# Patient Record
Sex: FEMALE | Race: AMERICAN INDIAN OR ALASKA NATIVE | Employment: OTHER | URBAN - METROPOLITAN AREA
[De-identification: names, ages, dates, MRNs, and addresses within clinical notes are randomized per-mention and may not be internally consistent; named-entity substitution may affect disease eponyms.]

---

## 2017-09-11 ENCOUNTER — HOSPITAL ENCOUNTER (OUTPATIENT)
Facility: CLINIC | Age: 72
Setting detail: OBSERVATION
Discharge: HOME | End: 2017-09-12
Attending: INTERNAL MEDICINE | Admitting: EMERGENCY MEDICINE
Payer: MEDICARE

## 2017-09-11 ENCOUNTER — APPOINTMENT (OUTPATIENT)
Dept: ULTRASOUND IMAGING | Facility: CLINIC | Age: 72
End: 2017-09-11
Attending: INTERNAL MEDICINE
Payer: MEDICARE

## 2017-09-11 ENCOUNTER — APPOINTMENT (OUTPATIENT)
Dept: GENERAL RADIOLOGY | Facility: CLINIC | Age: 72
End: 2017-09-11
Attending: INTERNAL MEDICINE
Payer: MEDICARE

## 2017-09-11 DIAGNOSIS — E11.9 DIABETES MELLITUS (H): ICD-10-CM

## 2017-09-11 DIAGNOSIS — E78.5 HYPERLIPIDEMIA, UNSPECIFIED HYPERLIPIDEMIA TYPE: ICD-10-CM

## 2017-09-11 DIAGNOSIS — R07.9 CHEST PAIN, UNSPECIFIED: ICD-10-CM

## 2017-09-11 DIAGNOSIS — R74.8 ELEVATED LIPASE: ICD-10-CM

## 2017-09-11 DIAGNOSIS — Z79.84 LONG TERM CURRENT USE OF ORAL HYPOGLYCEMIC DRUG: ICD-10-CM

## 2017-09-11 DIAGNOSIS — I10 ESSENTIAL HYPERTENSION, MALIGNANT: ICD-10-CM

## 2017-09-11 DIAGNOSIS — R07.89 ATYPICAL CHEST PAIN: ICD-10-CM

## 2017-09-11 DIAGNOSIS — R74.8 ACID PHOSPHATASE ELEVATED: ICD-10-CM

## 2017-09-11 LAB
ALBUMIN SERPL-MCNC: 3.7 G/DL (ref 3.4–5)
ALP SERPL-CCNC: 90 U/L (ref 40–150)
ALT SERPL W P-5'-P-CCNC: 30 U/L (ref 0–50)
AMYLASE SERPL-CCNC: 74 U/L (ref 30–110)
ANION GAP SERPL CALCULATED.3IONS-SCNC: 9 MMOL/L (ref 3–14)
AST SERPL W P-5'-P-CCNC: 19 U/L (ref 0–45)
BASOPHILS # BLD AUTO: 0 10E9/L (ref 0–0.2)
BASOPHILS NFR BLD AUTO: 0.3 %
BILIRUB SERPL-MCNC: 0.3 MG/DL (ref 0.2–1.3)
BUN SERPL-MCNC: 23 MG/DL (ref 7–30)
CALCIUM SERPL-MCNC: 8.5 MG/DL (ref 8.5–10.1)
CHLORIDE SERPL-SCNC: 100 MMOL/L (ref 94–109)
CO2 SERPL-SCNC: 29 MMOL/L (ref 20–32)
CREAT SERPL-MCNC: 0.91 MG/DL (ref 0.52–1.04)
CRP SERPL-MCNC: 9.4 MG/L (ref 0–8)
D DIMER PPP FEU-MCNC: 0.4 UG/ML FEU (ref 0–0.5)
DIFFERENTIAL METHOD BLD: NORMAL
EOSINOPHIL # BLD AUTO: 0.4 10E9/L (ref 0–0.7)
EOSINOPHIL NFR BLD AUTO: 3.8 %
ERYTHROCYTE [DISTWIDTH] IN BLOOD BY AUTOMATED COUNT: 13.2 % (ref 10–15)
GFR SERPL CREATININE-BSD FRML MDRD: 61 ML/MIN/1.7M2
GLUCOSE BLDC GLUCOMTR-MCNC: 155 MG/DL (ref 70–99)
GLUCOSE SERPL-MCNC: 154 MG/DL (ref 70–99)
HCT VFR BLD AUTO: 37 % (ref 35–47)
HGB BLD-MCNC: 12.4 G/DL (ref 11.7–15.7)
IMM GRANULOCYTES # BLD: 0.1 10E9/L (ref 0–0.4)
IMM GRANULOCYTES NFR BLD: 0.9 %
LIPASE SERPL-CCNC: 828 U/L (ref 73–393)
LYMPHOCYTES # BLD AUTO: 2.2 10E9/L (ref 0.8–5.3)
LYMPHOCYTES NFR BLD AUTO: 20.2 %
MAGNESIUM SERPL-MCNC: 2.1 MG/DL (ref 1.6–2.3)
MCH RBC QN AUTO: 32 PG (ref 26.5–33)
MCHC RBC AUTO-ENTMCNC: 33.5 G/DL (ref 31.5–36.5)
MCV RBC AUTO: 95 FL (ref 78–100)
MONOCYTES # BLD AUTO: 0.7 10E9/L (ref 0–1.3)
MONOCYTES NFR BLD AUTO: 6.8 %
NEUTROPHILS # BLD AUTO: 7.4 10E9/L (ref 1.6–8.3)
NEUTROPHILS NFR BLD AUTO: 68 %
NRBC # BLD AUTO: 0 10*3/UL
NRBC BLD AUTO-RTO: 0 /100
NT-PROBNP SERPL-MCNC: 132 PG/ML (ref 0–900)
PLATELET # BLD AUTO: 206 10E9/L (ref 150–450)
POTASSIUM SERPL-SCNC: 3.8 MMOL/L (ref 3.4–5.3)
PROT SERPL-MCNC: 7.3 G/DL (ref 6.8–8.8)
RBC # BLD AUTO: 3.88 10E12/L (ref 3.8–5.2)
SODIUM SERPL-SCNC: 138 MMOL/L (ref 133–144)
TROPONIN I BLD-MCNC: 0 UG/L (ref 0–0.1)
TROPONIN I SERPL-MCNC: <0.015 UG/L (ref 0–0.04)
TSH SERPL DL<=0.005 MIU/L-ACNC: 2.33 MU/L (ref 0.4–4)
WBC # BLD AUTO: 10.9 10E9/L (ref 4–11)

## 2017-09-11 PROCEDURE — 76705 ECHO EXAM OF ABDOMEN: CPT

## 2017-09-11 PROCEDURE — A9270 NON-COVERED ITEM OR SERVICE: HCPCS | Mod: GY | Performed by: INTERNAL MEDICINE

## 2017-09-11 PROCEDURE — 82150 ASSAY OF AMYLASE: CPT | Performed by: INTERNAL MEDICINE

## 2017-09-11 PROCEDURE — 85025 COMPLETE CBC W/AUTO DIFF WBC: CPT | Performed by: INTERNAL MEDICINE

## 2017-09-11 PROCEDURE — 25000132 ZZH RX MED GY IP 250 OP 250 PS 637: Mod: GY | Performed by: INTERNAL MEDICINE

## 2017-09-11 PROCEDURE — 83880 ASSAY OF NATRIURETIC PEPTIDE: CPT | Performed by: INTERNAL MEDICINE

## 2017-09-11 PROCEDURE — 84484 ASSAY OF TROPONIN QUANT: CPT | Mod: 91

## 2017-09-11 PROCEDURE — 83735 ASSAY OF MAGNESIUM: CPT | Performed by: INTERNAL MEDICINE

## 2017-09-11 PROCEDURE — 00000146 ZZHCL STATISTIC GLUCOSE BY METER IP

## 2017-09-11 PROCEDURE — 93005 ELECTROCARDIOGRAM TRACING: CPT | Performed by: INTERNAL MEDICINE

## 2017-09-11 PROCEDURE — 84484 ASSAY OF TROPONIN QUANT: CPT | Performed by: INTERNAL MEDICINE

## 2017-09-11 PROCEDURE — G0378 HOSPITAL OBSERVATION PER HR: HCPCS

## 2017-09-11 PROCEDURE — 93010 ELECTROCARDIOGRAM REPORT: CPT | Mod: Z6 | Performed by: INTERNAL MEDICINE

## 2017-09-11 PROCEDURE — 99207 ZZC APP CREDIT; MD BILLING SHARED VISIT: CPT | Mod: 25 | Performed by: INTERNAL MEDICINE

## 2017-09-11 PROCEDURE — 99285 EMERGENCY DEPT VISIT HI MDM: CPT | Mod: 25 | Performed by: INTERNAL MEDICINE

## 2017-09-11 PROCEDURE — 71010 XR CHEST PORT 1 VW: CPT

## 2017-09-11 PROCEDURE — 84443 ASSAY THYROID STIM HORMONE: CPT | Performed by: INTERNAL MEDICINE

## 2017-09-11 PROCEDURE — 86140 C-REACTIVE PROTEIN: CPT | Performed by: INTERNAL MEDICINE

## 2017-09-11 PROCEDURE — 83690 ASSAY OF LIPASE: CPT | Performed by: INTERNAL MEDICINE

## 2017-09-11 PROCEDURE — 85379 FIBRIN DEGRADATION QUANT: CPT | Performed by: INTERNAL MEDICINE

## 2017-09-11 PROCEDURE — 99219 ZZC INITIAL OBSERVATION CARE,LEVL II: CPT | Mod: Z6 | Performed by: EMERGENCY MEDICINE

## 2017-09-11 PROCEDURE — 80053 COMPREHEN METABOLIC PANEL: CPT | Performed by: INTERNAL MEDICINE

## 2017-09-11 RX ORDER — GLIPIZIDE 10 MG/1
10 TABLET ORAL
COMMUNITY

## 2017-09-11 RX ORDER — NITROGLYCERIN 0.4 MG/1
0.4 TABLET SUBLINGUAL EVERY 5 MIN PRN
Status: DISCONTINUED | OUTPATIENT
Start: 2017-09-11 | End: 2017-09-12 | Stop reason: HOSPADM

## 2017-09-11 RX ORDER — NALOXONE HYDROCHLORIDE 0.4 MG/ML
.1-.4 INJECTION, SOLUTION INTRAMUSCULAR; INTRAVENOUS; SUBCUTANEOUS
Status: DISCONTINUED | OUTPATIENT
Start: 2017-09-11 | End: 2017-09-12 | Stop reason: HOSPADM

## 2017-09-11 RX ORDER — ALUMINA, MAGNESIA, AND SIMETHICONE 2400; 2400; 240 MG/30ML; MG/30ML; MG/30ML
15-30 SUSPENSION ORAL EVERY 4 HOURS PRN
Status: DISCONTINUED | OUTPATIENT
Start: 2017-09-11 | End: 2017-09-12 | Stop reason: HOSPADM

## 2017-09-11 RX ORDER — LANSOPRAZOLE 30 MG/1
30 CAPSULE, DELAYED RELEASE ORAL DAILY
COMMUNITY

## 2017-09-11 RX ORDER — DEXTROSE MONOHYDRATE 25 G/50ML
25-50 INJECTION, SOLUTION INTRAVENOUS
Status: DISCONTINUED | OUTPATIENT
Start: 2017-09-11 | End: 2017-09-12 | Stop reason: HOSPADM

## 2017-09-11 RX ORDER — FLUOROMETHOLONE 0.1 %
1 SUSPENSION, DROPS(FINAL DOSAGE FORM)(ML) OPHTHALMIC (EYE) DAILY
COMMUNITY

## 2017-09-11 RX ORDER — SODIUM CHLORIDE 9 MG/ML
INJECTION, SOLUTION INTRAVENOUS CONTINUOUS
Status: DISCONTINUED | OUTPATIENT
Start: 2017-09-11 | End: 2017-09-12 | Stop reason: HOSPADM

## 2017-09-11 RX ORDER — GLIPIZIDE 5 MG/1
10 TABLET ORAL
Status: DISCONTINUED | OUTPATIENT
Start: 2017-09-12 | End: 2017-09-12 | Stop reason: HOSPADM

## 2017-09-11 RX ORDER — LEVOTHYROXINE SODIUM 25 UG/1
100 TABLET ORAL DAILY
Status: DISCONTINUED | OUTPATIENT
Start: 2017-09-12 | End: 2017-09-12 | Stop reason: HOSPADM

## 2017-09-11 RX ORDER — LANSOPRAZOLE 30 MG/1
30 CAPSULE, DELAYED RELEASE ORAL DAILY
Status: DISCONTINUED | OUTPATIENT
Start: 2017-09-12 | End: 2017-09-12 | Stop reason: HOSPADM

## 2017-09-11 RX ORDER — SUCRALFATE ORAL 1 G/10ML
1 SUSPENSION ORAL ONCE
Status: COMPLETED | OUTPATIENT
Start: 2017-09-11 | End: 2017-09-11

## 2017-09-11 RX ORDER — ACETAMINOPHEN 650 MG/1
650 SUPPOSITORY RECTAL EVERY 4 HOURS PRN
Status: DISCONTINUED | OUTPATIENT
Start: 2017-09-11 | End: 2017-09-12 | Stop reason: HOSPADM

## 2017-09-11 RX ORDER — MONTELUKAST SODIUM 10 MG/1
10 TABLET ORAL AT BEDTIME
COMMUNITY

## 2017-09-11 RX ORDER — ALBUTEROL SULFATE 0.83 MG/ML
2.5 SOLUTION RESPIRATORY (INHALATION) EVERY 6 HOURS PRN
Status: DISCONTINUED | OUTPATIENT
Start: 2017-09-11 | End: 2017-09-12 | Stop reason: HOSPADM

## 2017-09-11 RX ORDER — NICOTINE POLACRILEX 4 MG
15-30 LOZENGE BUCCAL
Status: DISCONTINUED | OUTPATIENT
Start: 2017-09-11 | End: 2017-09-12 | Stop reason: HOSPADM

## 2017-09-11 RX ORDER — ROSUVASTATIN CALCIUM 10 MG/1
10 TABLET, COATED ORAL AT BEDTIME
Status: DISCONTINUED | OUTPATIENT
Start: 2017-09-11 | End: 2017-09-12 | Stop reason: HOSPADM

## 2017-09-11 RX ORDER — TIOTROPIUM BROMIDE 18 UG/1
18 CAPSULE ORAL; RESPIRATORY (INHALATION) DAILY
COMMUNITY

## 2017-09-11 RX ORDER — LISINOPRIL 20 MG/1
40 TABLET ORAL DAILY
Status: DISCONTINUED | OUTPATIENT
Start: 2017-09-12 | End: 2017-09-12 | Stop reason: HOSPADM

## 2017-09-11 RX ORDER — LIDOCAINE 40 MG/G
CREAM TOPICAL
Status: DISCONTINUED | OUTPATIENT
Start: 2017-09-11 | End: 2017-09-12 | Stop reason: HOSPADM

## 2017-09-11 RX ORDER — LISINOPRIL 40 MG/1
40 TABLET ORAL DAILY
COMMUNITY

## 2017-09-11 RX ORDER — HYDROCHLOROTHIAZIDE 12.5 MG/1
25 TABLET ORAL DAILY
Status: DISCONTINUED | OUTPATIENT
Start: 2017-09-12 | End: 2017-09-12 | Stop reason: HOSPADM

## 2017-09-11 RX ORDER — ACETAMINOPHEN 325 MG/1
650 TABLET ORAL EVERY 4 HOURS PRN
Status: DISCONTINUED | OUTPATIENT
Start: 2017-09-11 | End: 2017-09-12 | Stop reason: HOSPADM

## 2017-09-11 RX ORDER — ROSUVASTATIN CALCIUM 10 MG/1
10 TABLET, COATED ORAL EVERY OTHER DAY
COMMUNITY

## 2017-09-11 RX ORDER — MONTELUKAST SODIUM 10 MG/1
10 TABLET ORAL AT BEDTIME
Status: DISCONTINUED | OUTPATIENT
Start: 2017-09-11 | End: 2017-09-12 | Stop reason: HOSPADM

## 2017-09-11 RX ORDER — LEVOTHYROXINE SODIUM 100 UG/1
100 TABLET ORAL DAILY
COMMUNITY

## 2017-09-11 RX ORDER — HYDROCHLOROTHIAZIDE 25 MG/1
25 TABLET ORAL DAILY
COMMUNITY

## 2017-09-11 RX ADMIN — SUCRALFATE 1 G: 1 SUSPENSION ORAL at 18:02

## 2017-09-11 ASSESSMENT — ENCOUNTER SYMPTOMS
WHEEZING: 0
SHORTNESS OF BREATH: 0
COUGH: 0
ADENOPATHY: 0
HEADACHES: 0
PALPITATIONS: 0
FEVER: 0
ABDOMINAL PAIN: 0
DIARRHEA: 0
CONFUSION: 0
NECK PAIN: 0
NUMBNESS: 0
BACK PAIN: 1
NAUSEA: 0
DIFFICULTY URINATING: 0
CHILLS: 0
LIGHT-HEADEDNESS: 0
VOMITING: 0
WEAKNESS: 0

## 2017-09-11 NOTE — ED NOTES
Walked patient to bathroom, no pain with that walk.  Pt said she feels better.  Voided x1.  Updated MD.

## 2017-09-11 NOTE — IP AVS SNAPSHOT
Unit 6D Observation [address] [address]    Phone:  [phone]    Fax:  [phone]                                       After Visit Summary   9/11/2017    Dionna Lal    MRN: [id number]           After Visit Summary Signature Page     I have received my discharge instructions, and my questions have been answered. I have discussed any challenges I see with this plan with the nurse or doctor.    ..........................................................................................................................................  Patient/Patient Representative Signature      ..........................................................................................................................................  Patient Representative Print Name and Relationship to Patient    ..................................................               ................................................  Date                                            Time    ..........................................................................................................................................  Reviewed by Signature/Title    ...................................................              ..............................................  Date                                                            Time

## 2017-09-11 NOTE — IP AVS SNAPSHOT
MRN:[id number]                      After Visit Summary   9/11/2017    Dionna Lal    MRN: [id number]           Thank you!     Thank you for choosing [facility] for your care. Our goal is always to provide you with excellent care. Hearing back from our patients is one way we can continue to improve our services. Please take a few minutes to complete the written survey that you may receive in the mail after you visit with us. Thank you!        Patient Information     Date Of Birth          1945        Designated Caregiver       Most Recent Value    Caregiver    Will someone help with your care after discharge? no      About your hospital stay     You were admitted on:  September 11, 2017 You last received care in the:  Unit 6D Observation [facility]    You were discharged on:  September 12, 2017        Reason for your hospital stay       Chest pain                  Who to Call     For medical emergencies, please call 911.  For non-urgent questions about your medical care, please call your primary care provider or clinic, [phone]          Attending Provider     Provider Specialty    Chaim Olivera MD Emergency Medicine    [facility], Nona Larios MD Emergency Medicine       Primary Care Provider Office Phone # Fax #    Maribel Reina [phone]       When to contact your care team       Please go to your nearest emergency room If you were to have a change in the type of chest pain i.e more severe, lasting longer or radiating to your shoulder, arm, neck, jaw or back, shortness of breath or increased pain with breathing, coughing up blood, feel dizzy or lightheaded, or notice swelling in one leg.                  After Care Instructions     Activity       Your activity upon discharge: As tolerated            Diet       Follow this diet upon discharge: As previous to your hospitalization            Discharge Instructions       The chest pain you came into the  "emergency room for does not appear to be cardiac chest pain. Your heart enzymes were normal which tells us there was no damage to the heart muscle.     I recommend continuing your home medications and it will be very important to follow up with your primary care doctor early next week.     Continue to drink plenty of fluids.                  Follow-up Appointments     Adult [facility] Follow-up and recommended labs and tests       Follow up with PCP in one week, return to the ER if having chest pain, syncope, shortness of breath or fever greater than 101F. Your cholesterol was elevated. Please discuss with your primary care doctor further cholesterol management. Your hemoglobin A1C was elevated at 7.6 Please also follow up with your primary care doctor for further diabetes management.      Appointments on [place] and/or [facility] (with [facility] or [facility] provider or service). Call [phone] if you haven't heard regarding these appointments within 7 days of discharge.                  Pending Results     No orders found for last 3 day(s).            Statement of Approval     Ordered          09/12/17 1142  I have reviewed and agree with all the recommendations and orders detailed in this document.  EFFECTIVE NOW     Approved and electronically signed by:  Caitlin Little APRN CNP             Admission Information     Date & Time Provider Department Dept. Phone    9/11/2017 Nona Rangel MD Unit 6D Observation [facility] [place] [phone]      Your Vitals Were     Blood Pressure Pulse Temperature Respirations Weight Pulse Oximetry    134/58 (BP Location: Right arm) 92 98.2  F (36.8  C) (Oral) 18 102.1 kg (225 lb) 100%    BMI (Body Mass Index)                   38.62 kg/m2           "[employer] Information     "[employer] lets you send messages to your doctor, view your test results, renew your prescriptions, schedule appointments and more. To sign up, go to [email or web address] . Click on \"Log in\" on the left " "side of the screen, which will take you to the Welcome page. Then click on \"Sign up Now\" on the right side of the page.     You will be asked to enter the access code listed below, as well as some personal information. Please follow the directions to create your username and password.     Your access code is: [email or web address]  Expires: 2017 11:43 AM     Your access code will  in 90 days. If you need help or a new code, please call your [facility] clinic or [phone].        Care EveryWhere ID     This is your Care EveryWhere ID. This could be used by other organizations to access your [facility] medical records  [id number]        Equal Access to Services     WILLIAN BRADLEY : Christopher Majano, marybel booth, javier llanos, trevor manzo. So [facility] [phone].    ATENCIÓN: Si habla español, tiene a gomez disposición servicios gratuitos de asistencia lingüística. Llame al [phone].    We comply with applicable federal civil rights laws and [place] laws. We do not discriminate on the basis of race, color, national origin, age, disability sex, sexual orientation or gender identity.               Review of your medicines      CONTINUE these medicines which have NOT CHANGED        Dose / Directions    * albuterol (2.5 MG/3ML) 0.083% neb solution        Dose:  1 ampule   Take 1 ampule by nebulization every 6 hours as needed.   Refills:  0       * PROAIR  (90 BASE) MCG/ACT Inhaler   Generic drug:  albuterol        Take two puffs every four hours for 24 hours, then two puffs four times daily for one week.   Quantity:  1 Inhaler   Refills:  0       carboxymethylcellulose 1 % ophthalmic solution   Commonly known as:  CELLUVISC/REFRESH LIQUIGEL        Dose:  1 drop   Place 1 drop into both eyes daily as needed for dry eyes   Refills:  0       fluorometholone 0.1 % ophthalmic susp   Commonly known as:  FML LIQUIFILM        Dose:  1 drop   Place 1 drop into " both eyes daily   Refills:  0       glipiZIDE 10 MG tablet   Commonly known as:  GLUCOTROL        Dose:  10 mg   Take 10 mg by mouth 2 times daily (before meals)   Refills:  0       hydrochlorothiazide 25 MG tablet   Commonly known as:  HYDRODIURIL        Dose:  25 mg   Take 25 mg by mouth daily   Refills:  0       insulin glargine 100 UNIT/ML injection   Commonly known as:  LANTUS        Dose:  20 Units   Inject 20 Units Subcutaneous At Bedtime   Refills:  0       LANsoprazole 30 MG CR capsule   Commonly known as:  PREVACID        Dose:  30 mg   Take 30 mg by mouth daily   Refills:  0       levothyroxine 100 MCG tablet   Commonly known as:  SYNTHROID/LEVOTHROID        Dose:  100 mcg   Take 100 mcg by mouth daily   Refills:  0       lisinopril 40 MG tablet   Commonly known as:  PRINIVIL/ZESTRIL        Dose:  40 mg   Take 40 mg by mouth daily   Refills:  0       loratadine 10 MG ODT tab   Commonly known as:  CLARITIN REDITABS        Dose:  10 mg   Take 10 mg by mouth daily.   Refills:  0       metFORMIN 850 MG tablet   Commonly known as:  GLUCOPHAGE        Dose:  850 mg   Take 850 mg by mouth 2 times daily (with meals)   Refills:  0       mometasone-formoterol 200-5 MCG/ACT oral inhaler   Commonly known as:  DULERA        Dose:  2 puff   Inhale 2 puffs into the lungs 2 times daily   Refills:  0       montelukast 10 MG tablet   Commonly known as:  SINGULAIR        Dose:  10 mg   Take 10 mg by mouth At Bedtime   Refills:  0       rosuvastatin 10 MG tablet   Commonly known as:  CRESTOR        Dose:  10 mg   Take 10 mg by mouth every other day   Refills:  0       tiotropium 18 MCG capsule   Commonly known as:  SPIRIVA        Dose:  18 mcg   Inhale 18 mcg into the lungs daily   Refills:  0       * Notice:  This list has 2 medication(s) that are the same as other medications prescribed for you. Read the directions carefully, and ask your doctor or other care provider to review them with you.             Protect others  around you: Learn how to safely use, store and throw away your medicines at www.disposemymeds.org.             Medication List: This is a list of all your medications and when to take them. Check marks below indicate your daily home schedule. Keep this list as a reference.      Medications           Morning Afternoon Evening Bedtime As Needed    * albuterol (2.5 MG/3ML) 0.083% neb solution   Take 1 ampule by nebulization every 6 hours as needed.                                * PROAIR  (90 BASE) MCG/ACT Inhaler   Take two puffs every four hours for 24 hours, then two puffs four times daily for one week.   Generic drug:  albuterol                                carboxymethylcellulose 1 % ophthalmic solution   Commonly known as:  CELLUVISC/REFRESH LIQUIGEL   Place 1 drop into both eyes daily as needed for dry eyes                                fluorometholone 0.1 % ophthalmic susp   Commonly known as:  FML LIQUIFILM   Place 1 drop into both eyes daily                                glipiZIDE 10 MG tablet   Commonly known as:  GLUCOTROL   Take 10 mg by mouth 2 times daily (before meals)   Last time this was given:  10 mg on 9/12/2017  8:29 AM                                hydrochlorothiazide 25 MG tablet   Commonly known as:  HYDRODIURIL   Take 25 mg by mouth daily   Last time this was given:  25 mg on 9/12/2017  8:29 AM                                insulin glargine 100 UNIT/ML injection   Commonly known as:  LANTUS   Inject 20 Units Subcutaneous At Bedtime   Last time this was given:  20 Units on 9/12/2017 12:05 AM                                LANsoprazole 30 MG CR capsule   Commonly known as:  PREVACID   Take 30 mg by mouth daily   Last time this was given:  30 mg on 9/12/2017  8:30 AM                                levothyroxine 100 MCG tablet   Commonly known as:  SYNTHROID/LEVOTHROID   Take 100 mcg by mouth daily   Last time this was given:  100 mcg on 9/12/2017  8:29 AM                                 lisinopril 40 MG tablet   Commonly known as:  PRINIVIL/ZESTRIL   Take 40 mg by mouth daily   Last time this was given:  40 mg on 9/12/2017  8:29 AM                                loratadine 10 MG ODT tab   Commonly known as:  CLARITIN REDITABS   Take 10 mg by mouth daily.                                metFORMIN 850 MG tablet   Commonly known as:  GLUCOPHAGE   Take 850 mg by mouth 2 times daily (with meals)   Last time this was given:  850 mg on 9/12/2017  8:30 AM                                mometasone-formoterol 200-5 MCG/ACT oral inhaler   Commonly known as:  DULERA   Inhale 2 puffs into the lungs 2 times daily   Last time this was given:  2 puffs on 9/12/2017  8:30 AM                                montelukast 10 MG tablet   Commonly known as:  SINGULAIR   Take 10 mg by mouth At Bedtime   Last time this was given:  10 mg on 9/12/2017 12:05 AM                                rosuvastatin 10 MG tablet   Commonly known as:  CRESTOR   Take 10 mg by mouth every other day   Last time this was given:  10 mg on 9/12/2017 12:05 AM                                tiotropium 18 MCG capsule   Commonly known as:  SPIRIVA   Inhale 18 mcg into the lungs daily                                * Notice:  This list has 2 medication(s) that are the same as other medications prescribed for you. Read the directions carefully, and ask your doctor or other care provider to review them with you.

## 2017-09-11 NOTE — ED PROVIDER NOTES
History     Chief Complaint   Patient presents with     Chest Pain     chest pain since yesterday     HPI  Dionna Lal is a 72 year old female who presents with upper sternal bilateral chest pain which radiates to the back. This has been present since yesterday. The pain comes when she is up exerting herself and resolves with rest. There is no change with respiration. She has no shortness of breath, cough, sputum, or palpitations. She has no nausea, vomiting or abdominal pain. She has no leg pain or swelling. She recently completed a course of prednisone for asthma. She has no swallowing difficulty or abdominal pain. She has history of hypotensive reaction to NSAIDs.    PAST MEDICAL HISTORY:   Past Medical History:   Diagnosis Date     Arthritis     knees and fingers and shoulders     Asthma      Diabetes mellitus (H)      Diverticulosis      GERD (gastroesophageal reflux disease)      Hypertension      Osteoporosis      Thyroid disease        PAST SURGICAL HISTORY:   Past Surgical History:   Procedure Laterality Date     EXTRACAPSULAR CATARACT EXTRATION WITH INTRAOCULAR LENS IMPLANT  2013     REMOVAL OF NAIL BED         FAMILY HISTORY:   Family History   Problem Relation Age of Onset     Hypertension Sister      DIABETES Sister      Hypertension Daughter      CEREBROVASCULAR DISEASE Brother        SOCIAL HISTORY:   Social History   Substance Use Topics     Smoking status: Former Smoker     Packs/day: 0.50     Years: 30.00     Smokeless tobacco: Never Used     Alcohol use No      Comment: 2 cans/week         I have reviewed the Medications, Allergies, Past Medical and Surgical History, and Social History in the Epic system.    Review of Systems   Constitutional: Negative for chills and fever.   HENT: Negative for congestion.    Eyes: Negative for visual disturbance.   Respiratory: Negative for cough, shortness of breath and wheezing.    Cardiovascular: Positive for chest pain. Negative for palpitations and  leg swelling.   Gastrointestinal: Negative for abdominal pain, diarrhea, nausea and vomiting.   Genitourinary: Negative for difficulty urinating.   Musculoskeletal: Positive for back pain. Negative for neck pain.   Skin: Negative for rash.   Neurological: Negative for weakness, light-headedness, numbness and headaches.   Hematological: Negative for adenopathy.   Psychiatric/Behavioral: Negative for confusion.       Physical Exam   BP: 146/68  Pulse: 92  Resp: 18  Weight: 102.1 kg (225 lb)  SpO2: 97 %  Physical Exam   Constitutional: She is oriented to person, place, and time. She appears well-developed and well-nourished. No distress.   Mildly cushingoid face   HENT:   Head: Normocephalic and atraumatic.   Right Ear: External ear normal.   Left Ear: External ear normal.   Nose: Nose normal.   Mouth/Throat: Oropharynx is clear and moist. No oropharyngeal exudate.   Eyes: EOM are normal. Pupils are equal, round, and reactive to light. No scleral icterus.   Neck: Normal range of motion. Neck supple. No JVD present. Carotid bruit is not present.   Cardiovascular: Normal rate, regular rhythm and normal heart sounds.  Exam reveals no friction rub.    No murmur heard.  Pulmonary/Chest: Effort normal and breath sounds normal. She has no wheezes. She has no rales. She exhibits no tenderness.   Abdominal: Soft. Bowel sounds are normal. There is no tenderness. There is no rebound and no guarding.   Musculoskeletal: She exhibits no edema or tenderness.   Lymphadenopathy:     She has no cervical adenopathy.   Neurological: She is alert and oriented to person, place, and time. She displays normal reflexes. No cranial nerve deficit. She exhibits normal muscle tone. Coordination normal.   Skin: Skin is warm and dry. No rash noted.   Psychiatric: She has a normal mood and affect. Her behavior is normal.   Nursing note and vitals reviewed.      ED Course     ED Course     Procedures             EKG Interpretation:      Interpreted by  APRIL GASPAR  Time reviewed: 1731  Symptoms at time of EKG: chest pain   Rhythm: normal sinus   Rate: Normal  Axis: Normal  Ectopy: none  Conduction: right bundle branch block (complete)  ST Segments/ T Waves: T wave inversion V1  Q Waves: none  Comparison to prior: Unchanged from 03/03/15    Clinical Impression: no acute changes      Labs/Imaging    Results for orders placed or performed during the hospital encounter of 09/11/17 (from the past 24 hour(s))   EKG 12 lead   Result Value Ref Range    Interpretation ECG Click View Image link to view waveform and result    CBC with platelets differential   Result Value Ref Range    WBC 10.9 4.0 - 11.0 10e9/L    RBC Count 3.88 3.8 - 5.2 10e12/L    Hemoglobin 12.4 11.7 - 15.7 g/dL    Hematocrit 37.0 35.0 - 47.0 %    MCV 95 78 - 100 fl    MCH 32.0 26.5 - 33.0 pg    MCHC 33.5 31.5 - 36.5 g/dL    RDW 13.2 10.0 - 15.0 %    Platelet Count 206 150 - 450 10e9/L    Diff Method Automated Method     % Neutrophils 68.0 %    % Lymphocytes 20.2 %    % Monocytes 6.8 %    % Eosinophils 3.8 %    % Basophils 0.3 %    % Immature Granulocytes 0.9 %    Nucleated RBCs 0 0 /100    Absolute Neutrophil 7.4 1.6 - 8.3 10e9/L    Absolute Lymphocytes 2.2 0.8 - 5.3 10e9/L    Absolute Monocytes 0.7 0.0 - 1.3 10e9/L    Absolute Eosinophils 0.4 0.0 - 0.7 10e9/L    Absolute Basophils 0.0 0.0 - 0.2 10e9/L    Abs Immature Granulocytes 0.1 0 - 0.4 10e9/L    Absolute Nucleated RBC 0.0    Comprehensive metabolic panel   Result Value Ref Range    Sodium 138 133 - 144 mmol/L    Potassium 3.8 3.4 - 5.3 mmol/L    Chloride 100 94 - 109 mmol/L    Carbon Dioxide 29 20 - 32 mmol/L    Anion Gap 9 3 - 14 mmol/L    Glucose 154 (H) 70 - 99 mg/dL    Urea Nitrogen 23 7 - 30 mg/dL    Creatinine 0.91 0.52 - 1.04 mg/dL    GFR Estimate 61 >60 mL/min/1.7m2    GFR Estimate If Black 73 >60 mL/min/1.7m2    Calcium 8.5 8.5 - 10.1 mg/dL    Bilirubin Total 0.3 0.2 - 1.3 mg/dL    Albumin 3.7 3.4 - 5.0 g/dL    Protein Total 7.3 6.8  - 8.8 g/dL    Alkaline Phosphatase 90 40 - 150 U/L    ALT 30 0 - 50 U/L    AST 19 0 - 45 U/L   CRP inflammation   Result Value Ref Range    CRP Inflammation 9.4 (H) 0.0 - 8.0 mg/L   Lipase   Result Value Ref Range    Lipase 828 (H) 73 - 393 U/L   BNP   Result Value Ref Range    N-Terminal Pro BNP Inpatient 132 0 - 900 pg/mL   D dimer quantitative   Result Value Ref Range    D Dimer 0.4 0.0 - 0.50 ug/ml FEU   Magnesium   Result Value Ref Range    Magnesium 2.1 1.6 - 2.3 mg/dL   TSH with free T4 reflex   Result Value Ref Range    TSH 2.33 0.40 - 4.00 mU/L   Troponin I   Result Value Ref Range    Troponin I ES <0.015 0.000 - 0.045 ug/L   Amylase   Result Value Ref Range    Amylase 74 30 - 110 U/L   Troponin POCT   Result Value Ref Range    Troponin I 0.00 0.00 - 0.10 ug/L   Chest  XR, 1 view portable    Narrative    XR CHEST PORT 1 VW  9/11/2017 6:14 PM     HISTORY:  chest pain    COMPARISON: Film dated 9/15/2016    FINDINGS:  Heart and pulmonary vasculature are within normal limits.  Lungs appear clear.      Impression    IMPRESSION: No active infiltrate identified.    MARLEN DING MD   Abdomen US, limited (RUQ only)    Narrative    US ABDOMEN LIMITED  9/11/2017 7:58 PM     HISTORY:  pancreatitis    COMPARISON: None.    FINDINGS:   Gallbladder: Normal with no cholelithiasis, no wall thickening, and no  focal tenderness.    Bile ducts:  CHD is normal diameter.  No intrahepatic biliary  dilatation.    Liver:  The liver is echogenic consistent with fatty infiltration.    Pancreas:  Partially obscured by gas. Visualized portions appear  normal.    Right kidney:  Normal.       Impression    IMPRESSION:  No gallstones are identified. Visualized portions of the  pancreas appear normal.       Assessments & Plan (with Medical Decision Making)   Impression:  Older female with a history of Asthma and type 2 DM. She had some chest pain about 1.5 years ago and had a normal stress echo at that time. She just completed a course of  antibiotic and prednisone for asthma flare. For the past 2 days, she has had bilateral upper chest pain radiating to the upper back which worsened during activity such as light walking and upright position and resolved with sitting and rest. Eating had no effect on the pain. She did have improvement of the symptoms after arrival in the ED, possibly in response to carafate suspension. She has longstanding RBBB. She has no acute ischemic changes on EKG. Troponin is normal. CXR is unremarkable. She has normal troponin, LFT and d-dimer. Electrolytes are unremarkable. She has isolated elevation of lipase, with normal amylase. Her symptoms are very atypical for pancreatitis. She has normal RUQ US with no evidence of gallstones, cholecystitis or CBD widening. Her symptoms are more suggestive of exertional angina. She has multiple risk factors for ASCVD including age, diabetes, hypertension and past smoking. She is currently pain free. She did not receive aspirin since she has a history of apparent true anaphylactic reaction to NSAID.    She will be admitted to the 6D observation unit for chest pain observation with serial troponin, telemetry monitoring and stress echo. She should have repeat amylase and lipase in the AM. Suspect the elevated lipase is medication related and not the cause of her current symptoms. She should continue PPI and carafate.    I have reviewed the nursing notes.    I have reviewed the findings, diagnosis, plan and need for follow up with the patient.    New Prescriptions    No medications on file       Final diagnoses:   Atypical chest pain   Elevated lipase       9/11/2017   [facility], [place], EMERGENCY DEPARTMENT     Chaim Olivera MD  09/11/17 1907

## 2017-09-12 ENCOUNTER — APPOINTMENT (OUTPATIENT)
Dept: CARDIOLOGY | Facility: CLINIC | Age: 72
End: 2017-09-12
Attending: PHYSICIAN ASSISTANT
Payer: MEDICARE

## 2017-09-12 VITALS
WEIGHT: 225 LBS | BODY MASS INDEX: 38.62 KG/M2 | DIASTOLIC BLOOD PRESSURE: 58 MMHG | SYSTOLIC BLOOD PRESSURE: 134 MMHG | HEART RATE: 92 BPM | RESPIRATION RATE: 18 BRPM | TEMPERATURE: 98.2 F | OXYGEN SATURATION: 100 %

## 2017-09-12 LAB
CHOLEST SERPL-MCNC: 150 MG/DL
GLUCOSE BLDC GLUCOMTR-MCNC: 146 MG/DL (ref 70–99)
HBA1C MFR BLD: 7.6 % (ref 4.3–6)
HDLC SERPL-MCNC: 71 MG/DL
INTERPRETATION ECG - MUSE: NORMAL
LDLC SERPL CALC-MCNC: 42 MG/DL
LIPASE SERPL-CCNC: 297 U/L (ref 73–393)
NONHDLC SERPL-MCNC: 78 MG/DL
TRIGL SERPL-MCNC: 184 MG/DL
TROPONIN I SERPL-MCNC: <0.015 UG/L (ref 0–0.04)
TROPONIN I SERPL-MCNC: <0.015 UG/L (ref 0–0.04)

## 2017-09-12 PROCEDURE — 93321 DOPPLER ECHO F-UP/LMTD STD: CPT | Mod: 26 | Performed by: INTERNAL MEDICINE

## 2017-09-12 PROCEDURE — 83690 ASSAY OF LIPASE: CPT | Performed by: PHYSICIAN ASSISTANT

## 2017-09-12 PROCEDURE — A9270 NON-COVERED ITEM OR SERVICE: HCPCS | Mod: GY | Performed by: PHYSICIAN ASSISTANT

## 2017-09-12 PROCEDURE — 25000132 ZZH RX MED GY IP 250 OP 250 PS 637: Mod: GY | Performed by: PHYSICIAN ASSISTANT

## 2017-09-12 PROCEDURE — 96372 THER/PROPH/DIAG INJ SC/IM: CPT

## 2017-09-12 PROCEDURE — 25000125 ZZHC RX 250: Performed by: INTERNAL MEDICINE

## 2017-09-12 PROCEDURE — 93018 CV STRESS TEST I&R ONLY: CPT | Performed by: INTERNAL MEDICINE

## 2017-09-12 PROCEDURE — 00000146 ZZHCL STATISTIC GLUCOSE BY METER IP

## 2017-09-12 PROCEDURE — 25000131 ZZH RX MED GY IP 250 OP 636 PS 637: Mod: GY | Performed by: PHYSICIAN ASSISTANT

## 2017-09-12 PROCEDURE — 25500064 ZZH RX 255 OP 636: Performed by: INTERNAL MEDICINE

## 2017-09-12 PROCEDURE — 36415 COLL VENOUS BLD VENIPUNCTURE: CPT | Performed by: PHYSICIAN ASSISTANT

## 2017-09-12 PROCEDURE — 93016 CV STRESS TEST SUPVJ ONLY: CPT | Performed by: INTERNAL MEDICINE

## 2017-09-12 PROCEDURE — 40000264 ECHO DOBUTAMINE STRESS TEST WITH DEFINITY

## 2017-09-12 PROCEDURE — G0378 HOSPITAL OBSERVATION PER HR: HCPCS

## 2017-09-12 PROCEDURE — 94640 AIRWAY INHALATION TREATMENT: CPT

## 2017-09-12 PROCEDURE — 84484 ASSAY OF TROPONIN QUANT: CPT | Mod: 91 | Performed by: PHYSICIAN ASSISTANT

## 2017-09-12 PROCEDURE — 25000128 H RX IP 250 OP 636: Performed by: INTERNAL MEDICINE

## 2017-09-12 PROCEDURE — 83036 HEMOGLOBIN GLYCOSYLATED A1C: CPT | Performed by: PHYSICIAN ASSISTANT

## 2017-09-12 PROCEDURE — 93325 DOPPLER ECHO COLOR FLOW MAPG: CPT | Mod: 26 | Performed by: INTERNAL MEDICINE

## 2017-09-12 PROCEDURE — 93350 STRESS TTE ONLY: CPT | Mod: 26 | Performed by: INTERNAL MEDICINE

## 2017-09-12 PROCEDURE — 99217 ZZC OBSERVATION CARE DISCHARGE: CPT | Mod: Z6 | Performed by: NURSE PRACTITIONER

## 2017-09-12 PROCEDURE — 25000128 H RX IP 250 OP 636: Performed by: PHYSICIAN ASSISTANT

## 2017-09-12 PROCEDURE — 80061 LIPID PANEL: CPT | Performed by: PHYSICIAN ASSISTANT

## 2017-09-12 RX ORDER — METOPROLOL TARTRATE 1 MG/ML
15 INJECTION, SOLUTION INTRAVENOUS
Status: DISCONTINUED | OUTPATIENT
Start: 2017-09-12 | End: 2017-09-12

## 2017-09-12 RX ORDER — DOBUTAMINE HYDROCHLORIDE 200 MG/100ML
5-50 INJECTION INTRAVENOUS CONTINUOUS
Status: DISCONTINUED | OUTPATIENT
Start: 2017-09-12 | End: 2017-09-12

## 2017-09-12 RX ADMIN — MONTELUKAST SODIUM 10 MG: 10 TABLET, FILM COATED ORAL at 00:05

## 2017-09-12 RX ADMIN — METFORMIN HYDROCHLORIDE 850 MG: 850 TABLET, FILM COATED ORAL at 00:05

## 2017-09-12 RX ADMIN — METOPROLOL TARTRATE 3 MG: 5 INJECTION INTRAVENOUS at 09:44

## 2017-09-12 RX ADMIN — HYDROCHLOROTHIAZIDE 25 MG: 12.5 TABLET ORAL at 08:29

## 2017-09-12 RX ADMIN — INSULIN GLARGINE 20 UNITS: 100 INJECTION, SOLUTION SUBCUTANEOUS at 00:05

## 2017-09-12 RX ADMIN — PERFLUTREN 7 ML: 6.52 INJECTION, SUSPENSION INTRAVENOUS at 09:46

## 2017-09-12 RX ADMIN — GLIPIZIDE 10 MG: 5 TABLET ORAL at 08:29

## 2017-09-12 RX ADMIN — LANSOPRAZOLE 30 MG: 30 CAPSULE, DELAYED RELEASE ORAL at 08:30

## 2017-09-12 RX ADMIN — DOBUTAMINE IN DEXTROSE 10 MCG/KG/MIN: 200 INJECTION, SOLUTION INTRAVENOUS at 09:38

## 2017-09-12 RX ADMIN — METFORMIN HYDROCHLORIDE 850 MG: 850 TABLET, FILM COATED ORAL at 08:30

## 2017-09-12 RX ADMIN — ROSUVASTATIN CALCIUM 10 MG: 10 TABLET, FILM COATED ORAL at 00:05

## 2017-09-12 RX ADMIN — LISINOPRIL 40 MG: 20 TABLET ORAL at 08:29

## 2017-09-12 RX ADMIN — LEVOTHYROXINE SODIUM 100 MCG: 25 TABLET ORAL at 08:29

## 2017-09-12 RX ADMIN — SODIUM CHLORIDE: 9 INJECTION, SOLUTION INTRAVENOUS at 00:56

## 2017-09-12 NOTE — PLAN OF CARE
Problem: Discharge Planning  Goal: Discharge Planning (Adult, OB, Behavioral, Peds)  List all goals to be met before discharge home:     - Serial troponins and stress test complete: NO  - Seen and cleared by consultant if applicable : NO  - Adequate pain control on oral analgesia : YES, patient denies pain at this time.  - Vital signs normal or at patient baseline : YES  - Safe disposition plan has been identified : NO        Nurse to notify provider when observation goals have been met and patient is ready for discharge.

## 2017-09-12 NOTE — ED NOTES
ED to Floor Handoff      S:  Dionna Lal is a 72 year old female who speaks English and lives with family members,  in a home  They arrived in the ED by car from home with a complaint of Chest Pain (chest pain since yesterday)    Initial vitals were:   BP: 146/68  Pulse: 92  Heart Rate: 86  Temp: 98  F (36.7  C)  Resp: 18  Weight: 102.1 kg (225 lb)  SpO2: 97 %  Allergies:   Allergies   Allergen Reactions     Nsaids Shortness Of Breath   .  The meds given in the ED and their home medications are:   No current facility-administered medications for this encounter.      Current Outpatient Prescriptions   Medication     predniSONE (DELTASONE) 20 MG tablet     guaiFENesin-codeine (ROBITUSSIN AC) 100-10 MG/5ML SOLN     amoxicillin-clavulanate (AUGMENTIN) 875-125 MG per tablet     Rosuvastatin Calcium (CRESTOR PO)     insulin glargine (LANTUS) 100 UNIT/ML vial     albuterol (PROAIR HFA) 108 (90 BASE) MCG/ACT inhaler     LISINOPRIL PO     HYDROCHLOROTHIAZIDE PO     LANSOPRAZOLE PO     GLIPIZIDE PO     Levothyroxine Sodium (LEVOTHROID PO)     fluticasone (FLOVENT HFA) 220 MCG/ACT inhaler     albuterol (2.5 MG/3ML) 0.083% nebulizer solution     loratadine (CLARITIN REDITABS) 10 MG dissolvable tablet     Montelukast Sodium (SINGULAIR PO)     METFORMIN HCL PO     Social demographics are   Social History     Social History     Marital status: Single     Spouse name: N/A     Number of children: N/A     Years of education: N/A     Social History Main Topics     Smoking status: Former Smoker     Packs/day: 0.50     Years: 30.00     Smokeless tobacco: Never Used     Alcohol use No      Comment: 2 cans/week     Drug use: No     Sexual activity: No     Other Topics Concern     None     Social History Narrative       B:   The patient has been ill for 2 day(s) and during this time the symptoms have increased.  In the ED was diagnosed with   Final diagnoses:   Atypical chest pain   Elevated lipase    Infection/sepsis suspected:No  Isolation type; No active isolations   A:   In the ED these meds were given:   Medications   sucralfate (CARAFATE) suspension 1 g (1 g Oral Given 9/11/17 1802)     Drips running?  No  Labs results   Labs Ordered and Resulted from Time of ED Arrival Up to the Time of Departure from the ED   COMPREHENSIVE METABOLIC PANEL - Abnormal; Notable for the following:        Result Value    Glucose 154 (*)     All other components within normal limits   CRP INFLAMMATION - Abnormal; Notable for the following:     CRP Inflammation 9.4 (*)     All other components within normal limits   LIPASE - Abnormal; Notable for the following:     Lipase 828 (*)     All other components within normal limits   CBC WITH PLATELETS DIFFERENTIAL   NT PROBNP INPATIENT   D DIMER QUANTITATIVE   MAGNESIUM   TSH WITH FREE T4 REFLEX   TROPONIN I   AMYLASE   MAY SALINE LOCK IV   CARDIAC CONTINUOUS MONITORING   PULSE OXIMETRY NURSING   TROPONIN POCT     Imaging Studies:   Recent Results (from the past 24 hour(s))   Chest  XR, 1 view portable    Narrative    XR CHEST PORT 1 VW  9/11/2017 6:14 PM     HISTORY:  chest pain    COMPARISON: Film dated 9/15/2016    FINDINGS:  Heart and pulmonary vasculature are within normal limits.  Lungs appear clear.      Impression    IMPRESSION: No active infiltrate identified.    MARLEN DING MD   Abdomen US, limited (RUQ only)    Narrative    US ABDOMEN LIMITED  9/11/2017 7:58 PM     HISTORY:  pancreatitis    COMPARISON: None.    FINDINGS:   Gallbladder: Normal with no cholelithiasis, no wall thickening, and no  focal tenderness.    Bile ducts:  CHD is normal diameter.  No intrahepatic biliary  dilatation.    Liver:  The liver is echogenic consistent with fatty infiltration.    Pancreas:  Partially obscured by gas. Visualized portions appear  normal.    Right kidney:  Normal.       Impression    IMPRESSION:  No gallstones are identified. Visualized portions of the  pancreas appear normal.     Recent vital signs /65   Pulse 92  Temp 98  F (36.7  C) (Oral)  Resp 21  Wt 102.1 kg (225 lb)  SpO2 95%  BMI 38.62 kg/m2  Cardiac Rhythm: ,   Cardiac  Cardiac Rhythm: Normal sinus rhythm  Abnormal labs/tests/findings requiring intervention:---  Pain control: good  Nausea control: pt had none  R:   Transfer assistance needed: Independent  Family present during ED course? No   Family currently present? No  Pt needs tele? Yes  Code Status: Full Code  Tasks needing to be completed:---    Connie Max  [facility]-- [phone] [place] [facility]  [phone] [facility]

## 2017-09-12 NOTE — PROGRESS NOTES
Patient discharged home. PIV removed. Patient has all belongings, understands and agrees with d/c instructions.

## 2017-09-12 NOTE — PROGRESS NOTES
Emergency Medicine Observation Attending note    The patient was independently seen and examined by me. The chart, vital signs, and labs were reviewed. The patient's findings were discussed with the YUDELKA on the observation unit, and I agree with the findings of the note and the plan.    71 yo female with h/o DM, HTN, HLD, admitted to the obs unit after presenting to the ER with c/o intermittent cp x 2 day. She reports that the pain is central, and radiates to her back. No associated SOB, N/V, diaphoresis, cough, or other complaints. She denies h/o DVT/PE, LE sx, recent surgery or bed rest, recent travel. EKG stable, trop neg, cxr neg. Lipase up, but no RUQ or epigastric pain/tenderness, and RUQ u/s nl in ED. She was given carafate in the ED - denies sx now.     /66 (BP Location: Right arm)  Pulse 92  Temp 98.3  F (36.8  C) (Oral)  Resp 18  Wt 102.1 kg (225 lb)  SpO2 96%  BMI 38.62 kg/m2    Exam:  General: awake, alert, NAD  HEENT: NC/AT, oropharynx moist and clear  Neck: supple  Lungs: CTA-B  Heart: RRR, no M/R/G  Abd: soft, ND/NT  Ext: non-tender, no edema    Assessment/plan:  1. Exertional CT: stable EKG, trop neg. CXR nl. Sx intermittent, and pt pain free now - PE and dissection very unlikely. Will continue to monitor overnight, and follow serial trops - will plan for stress test in the am.

## 2017-09-12 NOTE — H&P
[facility] ED Observation Admission Note    Chief Complaint   Patient presents with     Chest Pain     chest pain since yesterday       HPI:  Adapted from the ED Provider HPI:  Dionna Lal is a 72 year old female with a history of hypertension, hyperlipidemia, type 2 diabetes mellitus, asthma, hypothyroidism, GERD, who presents with upper bilateral chest pain which radiates to the back.     This has been present since yesterday. The pain comes when she is up exerting herself and resolves with rest. There is no change with respiration. She has no shortness of breath, cough, sputum, or palpitations. She has no nausea, vomiting or abdominal pain. She has no leg pain or swelling. She recently completed a course of prednisone for asthma. She has no swallowing difficulty or abdominal pain. She has history of hypotensive reaction to NSAIDs.    In the ED:  Vital signs within normal limits, afebrile. Normal troponin. Lipase noted to be elevated at 828 but amylase normal. RUQ U/S normal. Patient denies GI symptoms. D-dimer and BNP normal. EKG without changes. CXR normal. Patient administered carafate with some relief. Due to multiple risk factors including HTN, DM2, obestiy, HLP, patient admitted to observation unit for ACS rule out.    On admission to the observation unit, the patient reported symptoms had resolved and she was feeling her baseline. Agreeable to plan of care.    History:       Allergies   Allergen Reactions     Nsaids Shortness Of Breath       Past Medical History:   Diagnosis Date     Arthritis     knees and fingers and shoulders     Asthma      Diabetes mellitus (H)      Diverticulosis      GERD (gastroesophageal reflux disease)      Hypertension      Osteoporosis      Thyroid disease        Past Surgical History:   Procedure Laterality Date     EXTRACAPSULAR CATARACT EXTRATION WITH INTRAOCULAR LENS IMPLANT  2013     REMOVAL OF NAIL BED         Family History   Problem Relation Age of Onset      Hypertension Sister      DIABETES Sister      Hypertension Daughter      CEREBROVASCULAR DISEASE Brother        Social History     Social History     Marital status: Single     Spouse name: N/A     Number of children: N/A     Years of education: N/A     Occupational History     Not on file.     Social History Main Topics     Smoking status: Former Smoker     Packs/day: 0.50     Years: 30.00     Smokeless tobacco: Never Used     Alcohol use No      Comment: 2 cans/week     Drug use: No     Sexual activity: No     Other Topics Concern     Not on file     Social History Narrative         No current facility-administered medications on file prior to encounter.   Current Outpatient Prescriptions on File Prior to Encounter:  predniSONE (DELTASONE) 20 MG tablet Take two tablets (= 40mg) each day for 5 (five) days   guaiFENesin-codeine (ROBITUSSIN AC) 100-10 MG/5ML SOLN Take 10 mLs by mouth every 4 hours as needed   amoxicillin-clavulanate (AUGMENTIN) 875-125 MG per tablet Take 1 tablet by mouth 2 times daily   Rosuvastatin Calcium (CRESTOR PO) Take 10 mg by mouth every 48 hours    insulin glargine (LANTUS) 100 UNIT/ML vial Inject 20 Units Subcutaneous At Bedtime   albuterol (PROAIR HFA) 108 (90 BASE) MCG/ACT inhaler Take two puffs every four hours for 24 hours, then two puffs four times daily for one week.   LISINOPRIL PO Take 40 mg by mouth daily.     HYDROCHLOROTHIAZIDE PO Take 25 mg by mouth daily.     LANSOPRAZOLE PO Take 30 mg by mouth every morning (before breakfast).     GLIPIZIDE PO Take 10 mg by mouth 2 times daily (before meals).     Levothyroxine Sodium (LEVOTHROID PO) Take 50 mg by mouth.     fluticasone (FLOVENT HFA) 220 MCG/ACT inhaler Take 2 puffs by mouth 2 times daily.     albuterol (2.5 MG/3ML) 0.083% nebulizer solution Take 1 ampule by nebulization every 6 hours as needed.     loratadine (CLARITIN REDITABS) 10 MG dissolvable tablet Take 10 mg by mouth daily.     Montelukast Sodium (SINGULAIR PO) Take 10  mg by mouth At Bedtime.     METFORMIN HCL PO Take 850 mg by mouth 2 times daily (with meals).       Data:    Results for orders placed or performed during the hospital encounter of 09/11/17   Chest  XR, 1 view portable    Narrative    XR CHEST PORT 1 VW  9/11/2017 6:14 PM     HISTORY:  chest pain    COMPARISON: Film dated 9/15/2016    FINDINGS:  Heart and pulmonary vasculature are within normal limits.  Lungs appear clear.      Impression    IMPRESSION: No active infiltrate identified.    MARLEN DING MD   Abdomen US, limited (RUQ only)    Narrative    US ABDOMEN LIMITED  9/11/2017 7:58 PM     HISTORY:  pancreatitis    COMPARISON: None.    FINDINGS:   Gallbladder: Normal with no cholelithiasis, no wall thickening, and no  focal tenderness.    Bile ducts:  CHD is normal diameter.  No intrahepatic biliary  dilatation.    Liver:  The liver is echogenic consistent with fatty infiltration.    Pancreas:  Partially obscured by gas. Visualized portions appear  normal.    Right kidney:  Normal.       Impression    IMPRESSION:  No gallstones are identified. Visualized portions of the  pancreas appear normal.   CBC with platelets differential   Result Value Ref Range    WBC 10.9 4.0 - 11.0 10e9/L    RBC Count 3.88 3.8 - 5.2 10e12/L    Hemoglobin 12.4 11.7 - 15.7 g/dL    Hematocrit 37.0 35.0 - 47.0 %    MCV 95 78 - 100 fl    MCH 32.0 26.5 - 33.0 pg    MCHC 33.5 31.5 - 36.5 g/dL    RDW 13.2 10.0 - 15.0 %    Platelet Count 206 150 - 450 10e9/L    Diff Method Automated Method     % Neutrophils 68.0 %    % Lymphocytes 20.2 %    % Monocytes 6.8 %    % Eosinophils 3.8 %    % Basophils 0.3 %    % Immature Granulocytes 0.9 %    Nucleated RBCs 0 0 /100    Absolute Neutrophil 7.4 1.6 - 8.3 10e9/L    Absolute Lymphocytes 2.2 0.8 - 5.3 10e9/L    Absolute Monocytes 0.7 0.0 - 1.3 10e9/L    Absolute Eosinophils 0.4 0.0 - 0.7 10e9/L    Absolute Basophils 0.0 0.0 - 0.2 10e9/L    Abs Immature Granulocytes 0.1 0 - 0.4 10e9/L    Absolute Nucleated  RBC 0.0    Comprehensive metabolic panel   Result Value Ref Range    Sodium 138 133 - 144 mmol/L    Potassium 3.8 3.4 - 5.3 mmol/L    Chloride 100 94 - 109 mmol/L    Carbon Dioxide 29 20 - 32 mmol/L    Anion Gap 9 3 - 14 mmol/L    Glucose 154 (H) 70 - 99 mg/dL    Urea Nitrogen 23 7 - 30 mg/dL    Creatinine 0.91 0.52 - 1.04 mg/dL    GFR Estimate 61 >60 mL/min/1.7m2    GFR Estimate If Black 73 >60 mL/min/1.7m2    Calcium 8.5 8.5 - 10.1 mg/dL    Bilirubin Total 0.3 0.2 - 1.3 mg/dL    Albumin 3.7 3.4 - 5.0 g/dL    Protein Total 7.3 6.8 - 8.8 g/dL    Alkaline Phosphatase 90 40 - 150 U/L    ALT 30 0 - 50 U/L    AST 19 0 - 45 U/L   CRP inflammation   Result Value Ref Range    CRP Inflammation 9.4 (H) 0.0 - 8.0 mg/L   Lipase   Result Value Ref Range    Lipase 828 (H) 73 - 393 U/L   BNP   Result Value Ref Range    N-Terminal Pro BNP Inpatient 132 0 - 900 pg/mL   D dimer quantitative   Result Value Ref Range    D Dimer 0.4 0.0 - 0.50 ug/ml FEU   Magnesium   Result Value Ref Range    Magnesium 2.1 1.6 - 2.3 mg/dL   TSH with free T4 reflex   Result Value Ref Range    TSH 2.33 0.40 - 4.00 mU/L   Troponin I   Result Value Ref Range    Troponin I ES <0.015 0.000 - 0.045 ug/L   Amylase   Result Value Ref Range    Amylase 74 30 - 110 U/L   EKG 12 lead   Result Value Ref Range    Interpretation ECG Click View Image link to view waveform and result    Troponin POCT   Result Value Ref Range    Troponin I 0.00 0.00 - 0.10 ug/L             EKG Interpretation:       Interpreted by APRIL GASPAR  Time reviewed: 1731  Symptoms at time of EKG: chest pain   Rhythm: normal sinus   Rate: Normal  Axis: Normal  Ectopy: none  Conduction: right bundle branch block (complete)  ST Segments/ T Waves: T wave inversion V1  Q Waves: none  Comparison to prior: Unchanged from 03/03/15     Clinical Impression: no acute changes      ROS:    10 point ROS negative other than the symptoms noted above.      Exam:    Vitals:  B/P: 140/71, T: 98, P: 92, R:  17    Constitutional: healthy, alert and no distress. Overweight.  Head: Normocephalic.   Cardiovascular:  No lifts, heaves, or thrills. RRR. No murmurs, clicks gallops or rub  Respiratory: Good diaphragmatic excursion. Lungs clear  Gastrointestinal: Abdomen soft, non-tender. BS normal. No masses, organomegaly  : Deferred  Musculoskeletal: extremities normal- no gross deformities noted, normal muscle tone and no edema  Skin: no suspicious lesions or rashes  Neurologic: Alert and oriented. No facial droop, no slurred speech. Sensation grossly WNL.  Psychiatric: mentation appears normal and affect normal/bright      Assessment/Plan:  1. Chest pain: Pain developed yesterday with exertion, improves with rest. Patient localizes it to upper bilateral chest area radiating to the back. No dyspnea, nausea, abdominal pain. EKG unchanged- RBBB noted on previous. Troponin negative. D-dimer normal. BNP normal. CXR normal. Given carafate in ED with relief. Hx GERD, pain may be related to this. No chest wall tenderness to suggest MSK etiology. RF: HTN, DM2, obesity, hyperlipidemia, former smoker. Allergy to aspirin noted, no ASA given.  - Admit to observation unit  - Serial troponins x2 more  - Continuous telemetry  - Dobutamine stress echo in am given recent asthma exacerbation  - EKG/nitro prn if chest pain  - Maalox prn if heartburn  - Clear liquid diet at midnight     2. Elevated lipase: Lipase was 828 in ED with normal amylase and LFTs. RUQ ultrasound unremarkable. Denies abdominal pain or nausea/vomiting  - Monitor for abdominal pain, nausea/vomiting  - Repeat lipase in AM    3. Type 2 diabetes mellitus: Glucose 154 in ED. Last A1C 7.4 on 1/30/2017 per care everywhere.   - Continue home medications metformin 850 mg TID, glipizide 10 mg BID  - Continue home lantus 20 U qhs  - BG checks before meals, at bedtime    4. Hypertension: -146/66-68 in ED.  - Continue home lisinopril 40 mg daily, hydrochlorothiazide 25 mg  daily    5. Hyperlipidemia: last lipid panel in care everywhere 2014, within normal limits.  - Continue rosuvastatin 10 mg at bedtime (every other day, due this evening)  - Lipid panel in am    6. Hypothyroidism: TSH 2.33 in ED.  - Continue levothyroxine 100 mg daily    7. Asthma: Recently finished prednisone taper for exacerbation. Currently reports respiratory status at baseline. No wheezing, no fever or cough, CXR normal.   - Continue montelukast 10 mg daily  - Albuterol nebulizer q6h prn  - Home dulera inhaler BID that patient brought from home, hold spiriva for now- anticipate short stay       Signed:  Shani Diamond PA-C  September 11, 2017 at 10:58 PM

## 2017-09-12 NOTE — PLAN OF CARE
Problem: Discharge Planning  Goal: Discharge Planning (Adult, OB, Behavioral, Peds)  Outpatient/Observation goals to be met before discharge home:     - Serial troponins and stress test complete: NO 2 of 3 trop negative, stress test due to be done this morning.   - Seen and cleared by consultant if applicable : yes  - Adequate pain control on oral analgesia : yes  - Vital signs normal or at patient baseline : yes  - Safe disposition plan has been identified : yes

## 2017-09-12 NOTE — PROGRESS NOTES
Emergency Medicine Observation Attending note     The patient was independently seen and examined by me. The chart, vital signs, and labs were reviewed. The patient's findings were discussed with the YUDELKA on the observation unit, and I agree with the findings of the note and the plan.     71 yo female with h/o DM, HTN, HLD, admitted to the obs unit after presenting to the ER with c/o intermittent cp x 2 paz. She reported that the pain was central, and radiated to her back. No associated SOB, N/V, diaphoresis, cough, or other complaints. She denies h/o DVT/PE, LE sx, recent surgery or bed rest, recent travel. EKG stable, trop neg, cxr neg. Lipase up last night, but no RUQ or epigastric pain/tenderness, and RUQ u/s nl in ED. She was given carafate in the ED - sx resolved.  Thiss am she is sx free.     /58 (BP Location: Right arm)  Pulse 92  Temp 98.2  F (36.8  C) (Oral)  Resp 18  Wt 102.1 kg (225 lb)  SpO2 100%  BMI 38.62 kg/m2    Exam:  General: awake, alert, NAD  HEENT: NC/AT, oropharynx moist and clear  Neck: supple  Lungs: CTA-B  Heart: RRR, no M/R/G  Abd: soft, ND/NT  Ext: non-tender, no edema     Assessment/plan:  1. Exertional CT: stable EKG, trop neg. CXR nl. Sx intermittent, and pt pain free now - PE and dissection very unlikely. Serial trops neg. Awaiting stress test.  2. Elevated lipase yest - benign abd - decreased today. Unsure why. RUQ u/s neg. Will rec f/u.

## 2017-09-12 NOTE — PHARMACY-ADMISSION MEDICATION HISTORY
Admission Medication History status for the 9/11/2017 admission is complete.  See EPIC admission navigator for Prior to Admission medications.    Medication history sources:  patient     Medication history source reliability: Good    Medication adherence:  Good    Changes made to PTA medication list (reason)  Added: Spiriva 18mcg daily; Dulera 200/5mcg BID; Refresh 1% PRN; fluorometholone 0.1% daily  Deleted: prednisone 40mg daily; Augmentin 875/125mg BID; Flovent HFA 220mcg/act BID; Robitussin -10mg/5mL PRN  Changed: levothyroxine 50mcg daily -> 100mcg daily    Additional medication history information (including reliability of information, actions taken by pharmacist):   1. Insulin:   - Lantus 20 units HS, last dose 9/10 HS  2. Levothyroxine dose:   - Pt reports that levothyroxine pill is a yellow oval tablet which corresponds to 100mcg tablets. Confirmed 100mcg dose with [facility] Everywhere. Unable to confirm with [facility] pharmacy [phone] because they are closed, can try in the AM if needed.    Time spent in this activity: 30 min    Medication history completed by: Eri Stone, pharmacy intern    Prior to Admission medications    Medication Sig Last Dose Taking? Auth Provider   glipiZIDE (GLUCOTROL) 10 MG tablet Take 10 mg by mouth 2 times daily (before meals) 9/11/2017 at am Yes Unknown, Entered By History   hydrochlorothiazide (HYDRODIURIL) 25 MG tablet Take 25 mg by mouth daily 9/11/2017 at am Yes Unknown, Entered By History   LANsoprazole (PREVACID) 30 MG CR capsule Take 30 mg by mouth daily 9/11/2017 at am Yes Unknown, Entered By History   levothyroxine (SYNTHROID/LEVOTHROID) 100 MCG tablet Take 100 mcg by mouth daily 9/11/2017 at am Yes Unknown, Entered By History   lisinopril (PRINIVIL/ZESTRIL) 40 MG tablet Take 40 mg by mouth daily 9/11/2017 at am Yes Unknown, Entered By History   metFORMIN (GLUCOPHAGE) 850 MG tablet Take 850 mg by mouth 2 times daily (with meals) 9/11/2017 at am  Yes Unknown, Entered By History   montelukast (SINGULAIR) 10 MG tablet Take 10 mg by mouth At Bedtime 9/10/2017 at pm Yes Unknown, Entered By History   rosuvastatin (CRESTOR) 10 MG tablet Take 10 mg by mouth every other day 9/9/2017 at hs Yes Unknown, Entered By History   fluorometholone (FML LIQUIFILM) 0.1 % ophthalmic susp Place 1 drop into both eyes daily 9/11/2017 at am Yes Unknown, Entered By History   carboxymethylcellulose (CELLUVISC/REFRESH LIQUIGEL) 1 % ophthalmic solution Place 1 drop into both eyes daily as needed for dry eyes Past Week at  Yes Unknown, Entered By History   tiotropium (SPIRIVA) 18 MCG capsule Inhale 18 mcg into the lungs daily 9/11/2017 at am Yes Unknown, Entered By History   mometasone-formoterol (DULERA) 200-5 MCG/ACT oral inhaler Inhale 2 puffs into the lungs 2 times daily 9/11/2017 at am Yes Unknown, Entered By History   insulin glargine (LANTUS) 100 UNIT/ML vial Inject 20 Units Subcutaneous At Bedtime 9/10/2017 at hs Yes Reported, Patient   albuterol (PROAIR HFA) 108 (90 BASE) MCG/ACT inhaler Take two puffs every four hours for 24 hours, then two puffs four times daily for one week. Past Month at  Yes Pablo Hartmann MD   loratadine (CLARITIN REDITABS) 10 MG dissolvable tablet Take 10 mg by mouth daily.   9/11/2017 at am Yes Reported, Patient   albuterol (2.5 MG/3ML) 0.083% nebulizer solution Take 1 ampule by nebulization every 6 hours as needed.   More than a month at   Reported, Patient

## 2017-09-12 NOTE — PLAN OF CARE
Problem: Discharge Planning  Goal: Discharge Planning (Adult, OB, Behavioral, Peds)  List all goals to be met before discharge home:     - Serial troponins and stress test complete: NO, troponins negative x2, awaiting dobutamine stress echo in AM  - Seen and cleared by consultant if applicable : NO  - Adequate pain control on oral analgesia : YES, patient denies pain at this time.  - Vital signs normal or at patient baseline : YES  - Safe disposition plan has been identified : NO        Patient is ambulating with stand by assist. Patient is voiding spontaneously. Patient continues to deny pain at this time. Patient in SR with RBBB and occasional PVCs on tele. Will continue to monitor.      Nurse to notify provider when observation goals have been met and patient is ready for discharge.

## 2017-09-12 NOTE — DISCHARGE SUMMARY
Discharge Summary    Dionna Lal MRN# [id number]   YOB: 1945 Age: 72 year old     Date of Admission:  9/11/2017  Date of Discharge:  9/12/2017  Admitting Physician:  Nona Rangel MD  Discharge Physician:  Nona Rangel MD  Discharging Service:  Emergency Department Observation Unit     Primary Provider: Maribel Reina          Discharge Diagnosis:     * No active hospital problems. *    * No resolved hospital problems. *               Discharge Disposition:   Discharged to home           Condition on Discharge:   Discharge condition: Stable               Procedures:   Imaging performed:   Chest x-ray     Cardiology procedures perfromed:   Stress testing                 Discharge Medications:   Current Discharge Medication List      CONTINUE these medications which have NOT CHANGED    Details   glipiZIDE (GLUCOTROL) 10 MG tablet Take 10 mg by mouth 2 times daily (before meals)      hydrochlorothiazide (HYDRODIURIL) 25 MG tablet Take 25 mg by mouth daily      LANsoprazole (PREVACID) 30 MG CR capsule Take 30 mg by mouth daily      levothyroxine (SYNTHROID/LEVOTHROID) 100 MCG tablet Take 100 mcg by mouth daily      lisinopril (PRINIVIL/ZESTRIL) 40 MG tablet Take 40 mg by mouth daily      metFORMIN (GLUCOPHAGE) 850 MG tablet Take 850 mg by mouth 2 times daily (with meals)      montelukast (SINGULAIR) 10 MG tablet Take 10 mg by mouth At Bedtime      rosuvastatin (CRESTOR) 10 MG tablet Take 10 mg by mouth every other day      fluorometholone (FML LIQUIFILM) 0.1 % ophthalmic susp Place 1 drop into both eyes daily      carboxymethylcellulose (CELLUVISC/REFRESH LIQUIGEL) 1 % ophthalmic solution Place 1 drop into both eyes daily as needed for dry eyes      tiotropium (SPIRIVA) 18 MCG capsule Inhale 18 mcg into the lungs daily      mometasone-formoterol (DULERA) 200-5 MCG/ACT oral inhaler Inhale 2 puffs into the lungs 2 times daily      insulin glargine (LANTUS) 100 UNIT/ML vial Inject 20  Units Subcutaneous At Bedtime      albuterol (PROAIR HFA) 108 (90 BASE) MCG/ACT inhaler Take two puffs every four hours for 24 hours, then two puffs four times daily for one week.  Qty: 1 Inhaler, Refills: 0      loratadine (CLARITIN REDITABS) 10 MG dissolvable tablet Take 10 mg by mouth daily.        albuterol (2.5 MG/3ML) 0.083% nebulizer solution Take 1 ampule by nebulization every 6 hours as needed.                     Consultations:   No consultations were requested during this admission             Brief History of Illness:   Dionna Lal is a 72 year old female with a history of hypertension, hyperlipidemia, type 2 diabetes mellitus, asthma, hypothyroidism, GERD, who presents with upper bilateral chest pain which radiates to the back.           Hospital Course:   1. Chest pain: Pain developed yesterday with exertion, improves with rest. Patient localizes it to upper bilateral chest area radiating to the back. No dyspnea, nausea, abdominal pain. EKG unchanged- RBBB noted on previous. Troponin negative x 3. D-dimer normal. BNP normal. CXR normal. Given carafate in ED with relief. Hx GERD, pain may be related to this. No chest wall tenderness to suggest MSK etiology. RF: HTN, DM2, obesity, hyperlipidemia, former smoker. Allergy to aspirin noted, no ASA given. Stress test normal. Recommend follow up with PCP in one week.      2. Elevated lipase: Lipase was 828 decreased to 297. Patient denied any abdominal pain at discharge.      3. Type 2 diabetes mellitus: Glucose 154 in ED. A1C 7.6 today. Patient will follow up with PCP for further Diabetic management. on 1/30/2017 per care everywhere. Continue home medications metformin 850 mg TID, glipizide 10 mg BID, Continue home lantus 20 U qhs     4. Hypertension: -146/66-68 in ED.  - Continue home lisinopril 40 mg daily, hydrochlorothiazide 25 mg daily     5. Hyperlipidemia: last lipid panel in care everywhere 2014, within normal limits.  - Continue  rosuvastatin 10 mg at bedtime (every other day, due this evening). Cholesterol 150, HDL 71, LDL 42, Triglycerides 184.      6. Hypothyroidism: TSH 2.33 in ED. Continue levothyroxine 100 mg daily     7. Asthma: Recently finished prednisone taper for exacerbation. Currently reports respiratory status at baseline. No wheezing, no fever or cough, CXR normal. Patient was continued on her home medication.                Final Day of Progress before Discharge:       Physical Exam:  Blood pressure 134/58, pulse 92, temperature 98.2  F (36.8  C), temperature source Oral, resp. rate 18, weight 102.1 kg (225 lb), SpO2 100 %, not currently breastfeeding.    EXAM:  Constitutional: healthy, alert and no distress   Head: Normocephalic. No masses, lesions, tenderness or abnormalities   Neck: Neck supple. No adenopathy. Thyroid symmetric, normal size,, Carotids without bruits.   ENT: ENT exam normal, no neck nodes or sinus tenderness   Cardiovascular: RRR. No murmurs, clicks gallops or rub   Respiratory: . Good diaphragmatic excursion. Lungs clear   Gastrointestinal: Abdomen soft,. BS normal. No masses, organomegaly.   : Deferred   Musculoskeletal: extremities normal- no gross deformities noted, gait normal and normal muscle tone   Skin: no suspicious lesions or rashes   Neurologic: Gait normal. Reflexes normal and symmetric. Sensation grossly WNL.   Psychiatric: mentation appears normal and affect normal/bright   Hematologic/Lymphatic/Immunologic: normal ant/post cervical, axillary, supraclavicular and inguinal          Data:  All laboratory data reviewed             Significant Results:   None  Results for orders placed or performed during the hospital encounter of 09/11/17   Chest  XR, 1 view portable    Narrative    XR CHEST PORT 1 VW  9/11/2017 6:14 PM     HISTORY:  chest pain    COMPARISON: Film dated 9/15/2016    FINDINGS:  Heart and pulmonary vasculature are within normal limits.  Lungs appear clear.      Impression     IMPRESSION: No active infiltrate identified.    MARLEN DING MD   Abdomen US, limited (RUQ only)    Narrative    US ABDOMEN LIMITED  9/11/2017 7:58 PM     HISTORY:  pancreatitis    COMPARISON: None.    FINDINGS:   Gallbladder: Normal with no cholelithiasis, no wall thickening, and no  focal tenderness.    Bile ducts:  CHD is normal diameter.  No intrahepatic biliary  dilatation.    Liver:  The liver is echogenic consistent with fatty infiltration.    Pancreas:  Partially obscured by gas. Visualized portions appear  normal.    Right kidney:  Normal.       Impression    IMPRESSION:  No gallstones are identified. Visualized portions of the  pancreas appear normal.    MARLEN DING MD   CBC with platelets differential   Result Value Ref Range    WBC 10.9 4.0 - 11.0 10e9/L    RBC Count 3.88 3.8 - 5.2 10e12/L    Hemoglobin 12.4 11.7 - 15.7 g/dL    Hematocrit 37.0 35.0 - 47.0 %    MCV 95 78 - 100 fl    MCH 32.0 26.5 - 33.0 pg    MCHC 33.5 31.5 - 36.5 g/dL    RDW 13.2 10.0 - 15.0 %    Platelet Count 206 150 - 450 10e9/L    Diff Method Automated Method     % Neutrophils 68.0 %    % Lymphocytes 20.2 %    % Monocytes 6.8 %    % Eosinophils 3.8 %    % Basophils 0.3 %    % Immature Granulocytes 0.9 %    Nucleated RBCs 0 0 /100    Absolute Neutrophil 7.4 1.6 - 8.3 10e9/L    Absolute Lymphocytes 2.2 0.8 - 5.3 10e9/L    Absolute Monocytes 0.7 0.0 - 1.3 10e9/L    Absolute Eosinophils 0.4 0.0 - 0.7 10e9/L    Absolute Basophils 0.0 0.0 - 0.2 10e9/L    Abs Immature Granulocytes 0.1 0 - 0.4 10e9/L    Absolute Nucleated RBC 0.0    Comprehensive metabolic panel   Result Value Ref Range    Sodium 138 133 - 144 mmol/L    Potassium 3.8 3.4 - 5.3 mmol/L    Chloride 100 94 - 109 mmol/L    Carbon Dioxide 29 20 - 32 mmol/L    Anion Gap 9 3 - 14 mmol/L    Glucose 154 (H) 70 - 99 mg/dL    Urea Nitrogen 23 7 - 30 mg/dL    Creatinine 0.91 0.52 - 1.04 mg/dL    GFR Estimate 61 >60 mL/min/1.7m2    GFR Estimate If Black 73 >60 mL/min/1.7m2    Calcium 8.5 8.5  - 10.1 mg/dL    Bilirubin Total 0.3 0.2 - 1.3 mg/dL    Albumin 3.7 3.4 - 5.0 g/dL    Protein Total 7.3 6.8 - 8.8 g/dL    Alkaline Phosphatase 90 40 - 150 U/L    ALT 30 0 - 50 U/L    AST 19 0 - 45 U/L   CRP inflammation   Result Value Ref Range    CRP Inflammation 9.4 (H) 0.0 - 8.0 mg/L   Lipase   Result Value Ref Range    Lipase 828 (H) 73 - 393 U/L   BNP   Result Value Ref Range    N-Terminal Pro BNP Inpatient 132 0 - 900 pg/mL   D dimer quantitative   Result Value Ref Range    D Dimer 0.4 0.0 - 0.50 ug/ml FEU   Magnesium   Result Value Ref Range    Magnesium 2.1 1.6 - 2.3 mg/dL   TSH with free T4 reflex   Result Value Ref Range    TSH 2.33 0.40 - 4.00 mU/L   Troponin I   Result Value Ref Range    Troponin I ES <0.015 0.000 - 0.045 ug/L   Amylase   Result Value Ref Range    Amylase 74 30 - 110 U/L   Troponin I - Now then in 6 hours x 2     Result Value Ref Range    Troponin I ES <0.015 0.000 - 0.045 ug/L   Glucose by meter   Result Value Ref Range    Glucose 155 (H) 70 - 99 mg/dL   Troponin I - Now then in 6 hours x 2     Result Value Ref Range    Troponin I ES <0.015 0.000 - 0.045 ug/L   Lipid panel reflex to direct LDL   Result Value Ref Range    Cholesterol 150 <200 mg/dL    Triglycerides 184 (H) <150 mg/dL    HDL Cholesterol 71 >49 mg/dL    LDL Cholesterol Calculated 42 <100 mg/dL    Non HDL Cholesterol 78 <130 mg/dL   Hemoglobin A1c   Result Value Ref Range    Hemoglobin A1C 7.6 (H) 4.3 - 6.0 %   Lipase   Result Value Ref Range    Lipase 297 73 - 393 U/L   Glucose by meter   Result Value Ref Range    Glucose 146 (H) 70 - 99 mg/dL   EKG 12 lead   Result Value Ref Range    Interpretation ECG Click View Image link to view waveform and result    Troponin POCT   Result Value Ref Range    Troponin I 0.00 0.00 - 0.10 ug/L      Recent Results (from the past 48 hour(s))   Chest  XR, 1 view portable    Narrative    XR CHEST PORT 1 VW  9/11/2017 6:14 PM     HISTORY:  chest pain    COMPARISON: Film dated  9/15/2016    FINDINGS:  Heart and pulmonary vasculature are within normal limits.  Lungs appear clear.      Impression    IMPRESSION: No active infiltrate identified.    MARLEN DING MD   Abdomen US, limited (RUQ only)    Narrative    US ABDOMEN LIMITED  9/11/2017 7:58 PM     HISTORY:  pancreatitis    COMPARISON: None.    FINDINGS:   Gallbladder: Normal with no cholelithiasis, no wall thickening, and no  focal tenderness.    Bile ducts:  CHD is normal diameter.  No intrahepatic biliary  dilatation.    Liver:  The liver is echogenic consistent with fatty infiltration.    Pancreas:  Partially obscured by gas. Visualized portions appear  normal.    Right kidney:  Normal.       Impression    IMPRESSION:  No gallstones are identified. Visualized portions of the  pancreas appear normal.    MARLEN DING MD                Pending Results:   Unresulted Labs Ordered in the Past 30 Days of this Admission     No orders found for last 61 day(s).                  Discharge Instructions and Follow-Up:     Discharge Procedure Orders  Reason for your hospital stay   Order Comments: Chest pain     Adult [facility] Follow-up and recommended labs and tests   Order Comments: Follow up with PCP in one week, return to the ER if having chest pain, syncope, shortness of breath or fever greater than 101F.     Appointments on [place] and/or [facility] (with [facility] or [facility] provider or service). Call [phone] if you haven't heard regarding these appointments within 7 days of discharge.     Activity   Order Comments: Your activity upon discharge: As tolerated   Order Specific Question Answer Comments   Is discharge order? Yes      When to contact your care team   Order Comments: Please go to your nearest emergency room If you were to have a change in the type of chest pain i.e more severe, lasting longer or radiating to your shoulder, arm, neck, jaw or back, shortness of breath or increased pain with breathing, coughing up blood, feel dizzy  or lightheaded, or notice swelling in one leg.     Discharge Instructions   Order Comments: The chest pain you came into the emergency room for does not appear to be cardiac chest pain. Your heart enzymes were normal which tells us there was no damage to the heart muscle.    I recommend continuing your home medications and it will be very important to follow up with your primary care doctor early next week.     Continue to drink plenty of fluids.     Full Code     Diet   Order Comments: Follow this diet upon discharge: As previous to your hospitalization   Order Specific Question Answer Comments   Is discharge order? Yes             Attestation:  Caitlin Little.

## 2017-12-29 ENCOUNTER — HOSPITAL ENCOUNTER (EMERGENCY)
Facility: CLINIC | Age: 72
Discharge: HOME | End: 2017-12-29
Attending: EMERGENCY MEDICINE | Admitting: EMERGENCY MEDICINE
Payer: MEDICARE

## 2017-12-29 VITALS
OXYGEN SATURATION: 97 % | WEIGHT: 214 LBS | SYSTOLIC BLOOD PRESSURE: 129 MMHG | HEART RATE: 113 BPM | DIASTOLIC BLOOD PRESSURE: 66 MMHG | BODY MASS INDEX: 36.73 KG/M2 | RESPIRATION RATE: 18 BRPM | TEMPERATURE: 98.2 F

## 2017-12-29 DIAGNOSIS — J45.901 MODERATE ASTHMA WITH EXACERBATION, UNSPECIFIED WHETHER PERSISTENT: ICD-10-CM

## 2017-12-29 PROCEDURE — 99284 EMERGENCY DEPT VISIT MOD MDM: CPT | Mod: 25

## 2017-12-29 PROCEDURE — 25000125 ZZHC RX 250: Performed by: FAMILY MEDICINE

## 2017-12-29 PROCEDURE — 99284 EMERGENCY DEPT VISIT MOD MDM: CPT | Mod: 25 | Performed by: EMERGENCY MEDICINE

## 2017-12-29 PROCEDURE — 25000125 ZZHC RX 250: Performed by: EMERGENCY MEDICINE

## 2017-12-29 PROCEDURE — 93010 ELECTROCARDIOGRAM REPORT: CPT | Mod: Z6 | Performed by: EMERGENCY MEDICINE

## 2017-12-29 PROCEDURE — 93005 ELECTROCARDIOGRAM TRACING: CPT

## 2017-12-29 PROCEDURE — 94640 AIRWAY INHALATION TREATMENT: CPT

## 2017-12-29 RX ORDER — IPRATROPIUM BROMIDE AND ALBUTEROL SULFATE 2.5; .5 MG/3ML; MG/3ML
3 SOLUTION RESPIRATORY (INHALATION) ONCE
Status: COMPLETED | OUTPATIENT
Start: 2017-12-29 | End: 2017-12-29

## 2017-12-29 RX ORDER — PREDNISONE 20 MG/1
TABLET ORAL
Qty: 10 TABLET | Refills: 0 | Status: SHIPPED | OUTPATIENT
Start: 2017-12-29 | End: 2018-02-11

## 2017-12-29 RX ORDER — PREDNISONE 20 MG/1
40 TABLET ORAL ONCE
Status: COMPLETED | OUTPATIENT
Start: 2017-12-29 | End: 2017-12-29

## 2017-12-29 RX ADMIN — IPRATROPIUM BROMIDE AND ALBUTEROL SULFATE 3 ML: .5; 3 SOLUTION RESPIRATORY (INHALATION) at 10:40

## 2017-12-29 RX ADMIN — PREDNISONE 40 MG: 20 TABLET ORAL at 11:56

## 2017-12-29 RX ADMIN — IPRATROPIUM BROMIDE AND ALBUTEROL SULFATE 3 ML: .5; 3 SOLUTION RESPIRATORY (INHALATION) at 10:53

## 2017-12-29 ASSESSMENT — ENCOUNTER SYMPTOMS
ABDOMINAL PAIN: 0
SORE THROAT: 0
CONFUSION: 0
DIFFICULTY URINATING: 0
WHEEZING: 1
DIAPHORESIS: 0
FEVER: 0
COUGH: 1
HEADACHES: 0
RHINORRHEA: 0
CHILLS: 0
NECK STIFFNESS: 0
COLOR CHANGE: 0
EYE REDNESS: 0
ARTHRALGIAS: 0
SHORTNESS OF BREATH: 1

## 2017-12-29 NOTE — ED AVS SNAPSHOT
[facility], Emergency Department    [address]    [facility] [address]    Phone:  [phone]    Fax:  [phone]                                       Dionna Lal   MRN: [id number]    Department:  [facility], Emergency Department   Date of Visit:  12/29/2017           After Visit Summary Signature Page     I have received my discharge instructions, and my questions have been answered. I have discussed any challenges I see with this plan with the nurse or doctor.    ..........................................................................................................................................  Patient/Patient Representative Signature      ..........................................................................................................................................  Patient Representative Print Name and Relationship to Patient    ..................................................               ................................................  Date                                            Time    ..........................................................................................................................................  Reviewed by Signature/Title    ...................................................              ..............................................  Date                                                            Time

## 2017-12-29 NOTE — ED NOTES
Patient has had a cough for two weeks and is a chronic asthmatic.  She has been using her daily neb treatments and rescue inhalers but today sob has worsened.  She called her clinic but was unable to get in and they told her to come to ED.  States she thinks she needs steroids.  States the duo neb is helping.  Denies chest pain.

## 2017-12-29 NOTE — ED AVS SNAPSHOT
[facility], Emergency Department    [address]    [place] [address]    Phone:  [phone]    Fax:  [phone]                                       Dionna Lal   MRN: [id number]    Department:  [facility], Emergency Department   Date of Visit:  12/29/2017           Patient Information     Date Of Birth          1945        Your diagnoses for this visit were:     Moderate asthma with exacerbation, unspecified whether persistent        You were seen by Prabhakar Juarez MD.      Follow-up Information     Schedule an appointment as soon as possible for a visit with Maribel Reina MD.    Specialty:  Family Practice    Contact information:    [facility]  [address] [facility] [address] [phone]        24 Hour Appointment Hotline       To make an appointment at any [facility], call [phone] ([phone]). If you don't have a family doctor or clinic, we will help you find one. [facility] are conveniently located to serve the needs of you and your family.             Review of your medicines      START taking        Dose / Directions Last dose taken    predniSONE 20 MG tablet   Commonly known as:  DELTASONE   Quantity:  10 tablet        Take two tablets (= 40mg) each day for 5 (five) days   Refills:  0          Our records show that you are taking the medicines listed below. If these are incorrect, please call your family doctor or clinic.        Dose / Directions Last dose taken    * albuterol (2.5 MG/3ML) 0.083% neb solution   Dose:  1 ampule        Take 1 ampule by nebulization every 6 hours as needed.   Refills:  0        * PROAIR  (90 BASE) MCG/ACT Inhaler   Quantity:  1 Inhaler   Generic drug:  albuterol        Take two puffs every four hours for 24 hours, then two puffs four times daily for one week.   Refills:  0        carboxymethylcellulose 1 % ophthalmic solution   Commonly known as:  CELLUVISC/REFRESH LIQUIGEL   Dose:  1 drop         Place 1 drop into both eyes daily as needed for dry eyes   Refills:  0        fluorometholone 0.1 % ophthalmic susp   Commonly known as:  FML LIQUIFILM   Dose:  1 drop        Place 1 drop into both eyes daily   Refills:  0        glipiZIDE 10 MG tablet   Commonly known as:  GLUCOTROL   Dose:  10 mg        Take 10 mg by mouth 2 times daily (before meals)   Refills:  0        hydrochlorothiazide 25 MG tablet   Commonly known as:  HYDRODIURIL   Dose:  25 mg        Take 25 mg by mouth daily   Refills:  0        insulin glargine 100 UNIT/ML injection   Commonly known as:  LANTUS   Dose:  20 Units        Inject 20 Units Subcutaneous At Bedtime   Refills:  0        LANsoprazole 30 MG CR capsule   Commonly known as:  PREVACID   Dose:  30 mg        Take 30 mg by mouth daily   Refills:  0        levothyroxine 100 MCG tablet   Commonly known as:  SYNTHROID/LEVOTHROID   Dose:  100 mcg        Take 100 mcg by mouth daily   Refills:  0        lisinopril 40 MG tablet   Commonly known as:  PRINIVIL/ZESTRIL   Dose:  40 mg        Take 40 mg by mouth daily   Refills:  0        loratadine 10 MG ODT tab   Commonly known as:  CLARITIN REDITABS   Dose:  10 mg        Take 10 mg by mouth daily.   Refills:  0        metFORMIN 850 MG tablet   Commonly known as:  GLUCOPHAGE   Dose:  850 mg        Take 850 mg by mouth 2 times daily (with meals)   Refills:  0        mometasone-formoterol 200-5 MCG/ACT oral inhaler   Commonly known as:  DULERA   Dose:  2 puff        Inhale 2 puffs into the lungs 2 times daily   Refills:  0        montelukast 10 MG tablet   Commonly known as:  SINGULAIR   Dose:  10 mg        Take 10 mg by mouth At Bedtime   Refills:  0        rosuvastatin 10 MG tablet   Commonly known as:  CRESTOR   Dose:  10 mg        Take 10 mg by mouth every other day   Refills:  0        tiotropium 18 MCG capsule   Commonly known as:  SPIRIVA   Dose:  18 mcg        Inhale 18 mcg into the lungs daily   Refills:  0        * Notice:  This list  "has 2 medication(s) that are the same as other medications prescribed for you. Read the directions carefully, and ask your doctor or other care provider to review them with you.            Prescriptions were sent or printed at these locations (1 Prescription)                   Other Prescriptions                Printed at Department/Unit printer (1 of 1)         predniSONE (DELTASONE) 20 MG tablet                Procedures and tests performed during your visit     EKG 12 lead      Orders Needing Specimen Collection     None      Pending Results     No orders found from 12/27/2017 to 12/30/2017.            Pending Culture Results     No orders found from 12/27/2017 to 12/30/2017.            Pending Results Instructions     If you had any lab results that were not finalized at the time of your Discharge, you can call the ED Lab Result RN at [phone]. You will be contacted by this team for any positive Lab results or changes in treatment. The nurses are available 7 days a week from 10A to 6:30P.  You can leave a message 24 hours per day and they will return your call.        Thank you for choosing [facility]       Thank you for choosing [facility] for your care. Our goal is always to provide you with excellent care. Hearing back from our patients is one way we can continue to improve our services. Please take a few minutes to complete the written survey that you may receive in the mail after you visit with us. Thank you!        [employer] Information     [employer] lets you send messages to your doctor, view your test results, renew your prescriptions, schedule appointments and more. To sign up, go to [email or web address] . Click on \"Log in\" on the left side of the screen, which will take you to the Welcome page. Then click on \"Sign up Now\" on the right side of the page.     You will be asked to enter the access code listed below, as well as some personal information. Please follow the directions to create your username " and password.     Your access code is: [email or web address]  Expires: 3/29/2018 11:09 AM     Your access code will  in 90 days. If you need help or a new code, please call your [facility] clinic or [phone].        Care EveryWhere ID     This is your Care EveryWhere ID. This could be used by other organizations to access your [facility] medical records  [id number]        Equal Access to Services     WILLIAN BRADLEY : Christopher mckenzieo Sodc, waaxda luqadaha, qaybta kaalmada adeegyada, trevor tobar . So [facility] [phone].    ATENCIÓN: Si habla español, tiene a gomez disposición servicios gratuitos de asistencia lingüística. Llame al [phone].    We comply with applicable federal civil rights laws and [place] laws. We do not discriminate on the basis of race, color, national origin, age, disability, sex, sexual orientation, or gender identity.            After Visit Summary       This is your record. Keep this with you and show to your community pharmacist(s) and doctor(s) at your next visit.

## 2017-12-29 NOTE — ED PROVIDER NOTES
History   No chief complaint on file.    HPI  Dionna Lal is a 72 year old female with a history of asthma, diabetes, thyroid disease, hypertension, GERD, and diverticulosis who presents to the Emergency Department today for evaluation of shortness of breath. The patient states that she has been struggling with increased asthma for about the past 2 weeks. She reports that she has been having shortness of breath, a non productive cough for the last week and wheezing. She reports that she has been treating her symptoms with a nebulizer 3-4 times a day that has become less effective. This morning around 8:30 AM, about 2.5 hours ago, the patient states that she used the nebulizer but did not have much improvement in her symptoms. The patient inessa anyone being sick at home or being around people who smokes. The patient states that she was in on December 9th for similar complaints and was treated with 7 days of prednisone that helped improve her symptoms. She denies having a runny nose, congestion, sore throat fever, chills, sweats, chest pain. She also notes that she has been having difficulty tasting food and is planning to see ENT for this in January.     I have reviewed the Medications, Allergies, Past Medical and Surgical History, and Social History in the [employer] system.    Past Medical History:   Diagnosis Date     Arthritis     knees and fingers and shoulders     Asthma      Diabetes mellitus (H)      Diverticulosis      GERD (gastroesophageal reflux disease)      Hypertension      Osteoporosis      Thyroid disease        Past Surgical History:   Procedure Laterality Date     EXTRACAPSULAR CATARACT EXTRATION WITH INTRAOCULAR LENS IMPLANT  2013     REMOVAL OF NAIL BED         Family History   Problem Relation Age of Onset     Hypertension Sister      DIABETES Sister      Hypertension Daughter      CEREBROVASCULAR DISEASE Brother        Social History   Substance Use Topics     Smoking status: Former Smoker      Packs/day: 0.50     Years: 30.00     Smokeless tobacco: Never Used     Alcohol use No      Comment: 2 cans/week       No current facility-administered medications for this encounter.      Current Outpatient Prescriptions   Medication     predniSONE (DELTASONE) 20 MG tablet     glipiZIDE (GLUCOTROL) 10 MG tablet     hydrochlorothiazide (HYDRODIURIL) 25 MG tablet     LANsoprazole (PREVACID) 30 MG CR capsule     levothyroxine (SYNTHROID/LEVOTHROID) 100 MCG tablet     lisinopril (PRINIVIL/ZESTRIL) 40 MG tablet     metFORMIN (GLUCOPHAGE) 850 MG tablet     montelukast (SINGULAIR) 10 MG tablet     rosuvastatin (CRESTOR) 10 MG tablet     fluorometholone (FML LIQUIFILM) 0.1 % ophthalmic susp     carboxymethylcellulose (CELLUVISC/REFRESH LIQUIGEL) 1 % ophthalmic solution     tiotropium (SPIRIVA) 18 MCG capsule     mometasone-formoterol (DULERA) 200-5 MCG/ACT oral inhaler     insulin glargine (LANTUS) 100 UNIT/ML vial     albuterol (PROAIR HFA) 108 (90 BASE) MCG/ACT inhaler     albuterol (2.5 MG/3ML) 0.083% nebulizer solution     loratadine (CLARITIN REDITABS) 10 MG dissolvable tablet        Allergies   Allergen Reactions     Nsaids Shortness Of Breath      Review of Systems   Constitutional: Negative for chills, diaphoresis and fever.   HENT: Negative for congestion, rhinorrhea and sore throat.    Eyes: Negative for redness.   Respiratory: Positive for cough, shortness of breath and wheezing.    Cardiovascular: Negative for chest pain.   Gastrointestinal: Negative for abdominal pain.   Genitourinary: Negative for difficulty urinating.   Musculoskeletal: Negative for arthralgias and neck stiffness.   Skin: Negative for color change.   Neurological: Negative for headaches.   Psychiatric/Behavioral: Negative for confusion.   All other systems reviewed and are negative.      Physical Exam   BP: 128/76  Pulse: 113  Temp: 98.5  F (36.9  C)  Resp: 22  Weight: 97.1 kg (214 lb)  SpO2: 97 %      Physical Exam   Constitutional: No  distress.   HENT:   Head: Atraumatic.   Mouth/Throat: Oropharynx is clear and moist. No oropharyngeal exudate.   Eyes: Pupils are equal, round, and reactive to light. No scleral icterus.   Cardiovascular: Normal heart sounds and intact distal pulses.    Pulmonary/Chest: No respiratory distress. She has wheezes.   Abdominal: Soft. Bowel sounds are normal. There is no tenderness.   Musculoskeletal: She exhibits no edema or tenderness.   Skin: Skin is warm. No rash noted. She is not diaphoretic.       ED Course   11:11 AM  The patient was seen and examined by Dr. Juarez in Room 04.    ED Course     Procedures        No results found for this or any previous visit (from the past 24 hour(s)).    Labs Ordered and Resulted from Time of ED Arrival Up to the Time of Departure from the ED - No data to display       EKG Interpretation:      Interpreted by Prabhakar Juarez  Time reviewed: 11:02 AM  Symptoms at time of EKG: Dyspnea  Rhythm: normal sinus   Rate: Tachycardia  Axis: Right Axis Deviation  Ectopy: none  Conduction: left bundle branch block (complete)  ST Segments/ T Waves: No ST-T wave changes  Q Waves: none  Comparison to prior: Unchanged from September 11, 2017    Clinical Impression: no acute changes                  Medications   ipratropium - albuterol 0.5 mg/2.5 mg/3 mL (DUONEB) neb solution 3 mL (3 mLs Nebulization Given 12/29/17 1040)   ipratropium - albuterol 0.5 mg/2.5 mg/3 mL (DUONEB) neb solution 3 mL (3 mLs Nebulization Given 12/29/17 1053)   predniSONE (DELTASONE) tablet 40 mg (40 mg Oral Given 12/29/17 1156)         Assessments & Plan (with Medical Decision Making)   72-year-old female with a long-standing history of asthma presents with 1 day history of shortness of breath in the setting of nonproductive coughing for the past 1-2 weeks despite using at home current medications.  Differential included asthma exacerbation, pneumothorax, pneumonia, URI.  Initial exam revealed bilateral expiratory wheezing  and mild tachypnea and tachycardia.  The patient was treated with 2 DuoNeb's and 40 mg of prednisone and felt subjectively significantly improved.  Repeat exam revealed near complete clearing of wheezing.  Signs and symptoms most consistent with asthma exacerbation.  Patient will be discharged on 5 day course of prednisone with instructions to continue her current medications.    I have reviewed the nursing notes.    I have reviewed the findings, diagnosis, plan and need for follow up with the patient.    New Prescriptions    PREDNISONE (DELTASONE) 20 MG TABLET    Take two tablets (= 40mg) each day for 5 (five) days       Final diagnoses:   Moderate asthma with exacerbation, unspecified whether persistent   I, Ac Holden, am serving as a trained medical scribe to document services personally performed by Amado Juarez MD, based on the provider's statements to me.   IAmado MD, was physically present and have reviewed and verified the accuracy of this note documented by Ac Holden.     12/29/2017   [facility], [place], EMERGENCY DEPARTMENT     Prabhakar Juarez MD  12/29/17 1224       Prabhakar Juarez MD  12/29/17 1258

## 2017-12-29 NOTE — ED NOTES
Expiratory wheezes present all lobes posteriorly.  Second neb administered.  Pt feels  breathing is better.

## 2017-12-31 LAB — INTERPRETATION ECG - MUSE: NORMAL

## 2018-02-11 ENCOUNTER — APPOINTMENT (OUTPATIENT)
Dept: GENERAL RADIOLOGY | Facility: CLINIC | Age: 73
End: 2018-02-11
Attending: EMERGENCY MEDICINE
Payer: MEDICARE

## 2018-02-11 ENCOUNTER — HOSPITAL ENCOUNTER (EMERGENCY)
Facility: CLINIC | Age: 73
Discharge: HOME | End: 2018-02-11
Attending: EMERGENCY MEDICINE | Admitting: EMERGENCY MEDICINE
Payer: MEDICARE

## 2018-02-11 VITALS
TEMPERATURE: 97.7 F | HEART RATE: 120 BPM | SYSTOLIC BLOOD PRESSURE: 130 MMHG | OXYGEN SATURATION: 93 % | DIASTOLIC BLOOD PRESSURE: 75 MMHG | RESPIRATION RATE: 16 BRPM

## 2018-02-11 DIAGNOSIS — J45.31 MILD PERSISTENT ASTHMA WITH EXACERBATION: ICD-10-CM

## 2018-02-11 LAB
ALBUMIN SERPL-MCNC: 3.6 G/DL (ref 3.4–5)
ALP SERPL-CCNC: 73 U/L (ref 40–150)
ALT SERPL W P-5'-P-CCNC: 21 U/L (ref 0–50)
ANION GAP SERPL CALCULATED.3IONS-SCNC: 8 MMOL/L (ref 3–14)
AST SERPL W P-5'-P-CCNC: 12 U/L (ref 0–45)
BASOPHILS # BLD AUTO: 0.1 10E9/L (ref 0–0.2)
BASOPHILS NFR BLD AUTO: 0.5 %
BILIRUB SERPL-MCNC: 0.4 MG/DL (ref 0.2–1.3)
BUN SERPL-MCNC: 15 MG/DL (ref 7–30)
CALCIUM SERPL-MCNC: 8.6 MG/DL (ref 8.5–10.1)
CHLORIDE SERPL-SCNC: 96 MMOL/L (ref 94–109)
CO2 SERPL-SCNC: 30 MMOL/L (ref 20–32)
CREAT SERPL-MCNC: 0.85 MG/DL (ref 0.52–1.04)
DIFFERENTIAL METHOD BLD: ABNORMAL
EOSINOPHIL # BLD AUTO: 0.9 10E9/L (ref 0–0.7)
EOSINOPHIL NFR BLD AUTO: 9.1 %
ERYTHROCYTE [DISTWIDTH] IN BLOOD BY AUTOMATED COUNT: 12.5 % (ref 10–15)
GFR SERPL CREATININE-BSD FRML MDRD: 66 ML/MIN/1.7M2
GLUCOSE SERPL-MCNC: 154 MG/DL (ref 70–99)
HCT VFR BLD AUTO: 36.2 % (ref 35–47)
HGB BLD-MCNC: 12.2 G/DL (ref 11.7–15.7)
IMM GRANULOCYTES # BLD: 0.1 10E9/L (ref 0–0.4)
IMM GRANULOCYTES NFR BLD: 1.3 %
LYMPHOCYTES # BLD AUTO: 1.9 10E9/L (ref 0.8–5.3)
LYMPHOCYTES NFR BLD AUTO: 18.4 %
MCH RBC QN AUTO: 31.9 PG (ref 26.5–33)
MCHC RBC AUTO-ENTMCNC: 33.7 G/DL (ref 31.5–36.5)
MCV RBC AUTO: 95 FL (ref 78–100)
MONOCYTES # BLD AUTO: 0.6 10E9/L (ref 0–1.3)
MONOCYTES NFR BLD AUTO: 5.4 %
NEUTROPHILS # BLD AUTO: 6.8 10E9/L (ref 1.6–8.3)
NEUTROPHILS NFR BLD AUTO: 65.3 %
NRBC # BLD AUTO: 0 10*3/UL
NRBC BLD AUTO-RTO: 0 /100
PLATELET # BLD AUTO: 241 10E9/L (ref 150–450)
POTASSIUM SERPL-SCNC: 3.7 MMOL/L (ref 3.4–5.3)
PROT SERPL-MCNC: 7.6 G/DL (ref 6.8–8.8)
RBC # BLD AUTO: 3.83 10E12/L (ref 3.8–5.2)
SODIUM SERPL-SCNC: 134 MMOL/L (ref 133–144)
WBC # BLD AUTO: 10.4 10E9/L (ref 4–11)

## 2018-02-11 PROCEDURE — 80053 COMPREHEN METABOLIC PANEL: CPT | Performed by: EMERGENCY MEDICINE

## 2018-02-11 PROCEDURE — 99285 EMERGENCY DEPT VISIT HI MDM: CPT | Mod: Z6 | Performed by: EMERGENCY MEDICINE

## 2018-02-11 PROCEDURE — 85025 COMPLETE CBC W/AUTO DIFF WBC: CPT | Performed by: EMERGENCY MEDICINE

## 2018-02-11 PROCEDURE — 99284 EMERGENCY DEPT VISIT MOD MDM: CPT | Mod: 25 | Performed by: EMERGENCY MEDICINE

## 2018-02-11 PROCEDURE — 94640 AIRWAY INHALATION TREATMENT: CPT | Performed by: EMERGENCY MEDICINE

## 2018-02-11 PROCEDURE — 25000125 ZZHC RX 250: Performed by: EMERGENCY MEDICINE

## 2018-02-11 PROCEDURE — 96374 THER/PROPH/DIAG INJ IV PUSH: CPT | Performed by: EMERGENCY MEDICINE

## 2018-02-11 PROCEDURE — 94644 CONT INHLJ TX 1ST HOUR: CPT | Performed by: EMERGENCY MEDICINE

## 2018-02-11 PROCEDURE — 25000128 H RX IP 250 OP 636: Performed by: EMERGENCY MEDICINE

## 2018-02-11 PROCEDURE — 71046 X-RAY EXAM CHEST 2 VIEWS: CPT

## 2018-02-11 PROCEDURE — 94645 CONT INHLJ TX EACH ADDL HOUR: CPT | Performed by: EMERGENCY MEDICINE

## 2018-02-11 RX ORDER — METHYLPREDNISOLONE SODIUM SUCCINATE 125 MG/2ML
125 INJECTION, POWDER, LYOPHILIZED, FOR SOLUTION INTRAMUSCULAR; INTRAVENOUS ONCE
Status: COMPLETED | OUTPATIENT
Start: 2018-02-11 | End: 2018-02-11

## 2018-02-11 RX ORDER — IPRATROPIUM BROMIDE AND ALBUTEROL SULFATE 2.5; .5 MG/3ML; MG/3ML
3 SOLUTION RESPIRATORY (INHALATION) CONTINUOUS PRN
Status: DISCONTINUED | OUTPATIENT
Start: 2018-02-11 | End: 2018-02-11 | Stop reason: HOSPADM

## 2018-02-11 RX ORDER — PREDNISONE 20 MG/1
TABLET ORAL
Qty: 10 TABLET | Refills: 0 | Status: SHIPPED | OUTPATIENT
Start: 2018-02-11

## 2018-02-11 RX ORDER — IPRATROPIUM BROMIDE AND ALBUTEROL SULFATE 2.5; .5 MG/3ML; MG/3ML
3 SOLUTION RESPIRATORY (INHALATION) ONCE
Status: COMPLETED | OUTPATIENT
Start: 2018-02-11 | End: 2018-02-11

## 2018-02-11 RX ADMIN — METHYLPREDNISOLONE SODIUM SUCCINATE 125 MG: 125 INJECTION, POWDER, FOR SOLUTION INTRAMUSCULAR; INTRAVENOUS at 14:43

## 2018-02-11 RX ADMIN — IPRATROPIUM BROMIDE AND ALBUTEROL SULFATE 3 ML: .5; 3 SOLUTION RESPIRATORY (INHALATION) at 16:12

## 2018-02-11 RX ADMIN — IPRATROPIUM BROMIDE AND ALBUTEROL SULFATE 3 ML: .5; 3 SOLUTION RESPIRATORY (INHALATION) at 14:35

## 2018-02-11 ASSESSMENT — ENCOUNTER SYMPTOMS
COLOR CHANGE: 0
ARTHRALGIAS: 0
COUGH: 1
FEVER: 0
HEADACHES: 0
EYE REDNESS: 0
ABDOMINAL PAIN: 0
CONFUSION: 0
SHORTNESS OF BREATH: 1
NECK STIFFNESS: 0
DIFFICULTY URINATING: 0

## 2018-02-11 NOTE — DISCHARGE INSTRUCTIONS
"  Asthma (Adult)  Asthma is a disease where the medium and  small air passages within the lung go into spasm and restrict the flow of air. Inflammation and swelling of the airways cause further blockage. During an acute asthma attack, these factors cause trouble breathing, wheezing, cough and chest tightness.    An asthma attack can be triggered by many things. Common triggers include infections such as the common cold, bronchitis, and pneumonia. Irritants such as smoke or pollutants in the air, very cold air, emotional upset, and exercise can also trigger an attack. In many adults with asthma, allergies to dust, mold, pollen and animal dander can cause an asthma attack. Skipping doses of daily asthma medicine can also bring on an asthma attack.  Asthma can be controlled using the proper medicines prescribed by your healthcare provider and avoiding exposure to known triggers including allergens and irritants.  Home care    Take prescribed medicine exactly at the times advised. If you need medicine such as from a hand held inhaler or aerosol breathing machine more than every 4 hours, contact your healthcare provider or seek immediate medical attention. If prescribed an antibiotic or prednisone, take all of the medicine as prescribed, even if you are feeling better after a few days.    Do not smoke. Avoid being exposed to the smoke of others.    Some people with asthma have worsening of their symptoms when they take aspirin and non-steroidal or fever-reducing medicines like ibuprofen and naproxen. Talk to your healthcare provider if you think this may apply to you.  Follow-up care  Follow up with your healthcare provider, or as advised. Always bring all of your current medicines to any appointments with your healthcare provider. Also bring a complete list of medicines even those not taken for asthma. If you do not already have one, talk to your healthcare provider about developing a personalized \"Asthma Action " "Plan.\"  A pneumococcal (pneumonia) vaccine and yearly flu shot (every fall) are recommended. Ask your doctor about this.  When to seek medical advice  Call your healthcare provider right away if any of these occur:     Increased wheezing or shortness of breath    Need to use your inhalers more often than usual without relief    Fever of 100.4 F (38 C) or higher, or as directed by your healthcare provider    Coughing up lots of dark-colored or bloody sputum (mucus)    Chest pain with each breath    If you use a peak flow meter as part of an Asthma Action Plan, and you are still in the yellow zone (50% to 80%) 15 minutes after using inhaler medicine.  Call 911  Call 911 if any of the following occur    Trouble walking or talking because of shortness of breath    If you use a peak flow meter as part of an Asthma Action Plan and you are still in the red zone (less than 50%) 15 minutes after using inhaler medicine    Lips or fingernails turning gray or blue  Date Last Reviewed: 5/1/2017 2000-2017 [employer]. [address] [place] [address]. All rights reserved. This information is not intended as a substitute for professional medical care. Always follow your healthcare professional's instructions.          Controlling Your Asthma  You can do a lot to manage your asthma and improve your quality of life. You will need to work with your healthcare provider to develop a plan. But it s up to you to put this plan into action.  Why you need to take control  You need to control the inflammation in your lungs. Take all medicine as directed, especially controller medicines, even if you feel that your asthma is under good control. You also need to relieve symptoms when you have them. These are long-term tasks. But the more you stay in control, the better you ll feel. If you don t stay in control:    Asthma symptoms may cause you to miss school, work, or activities that you enjoy.    Asthma flare-ups can " be dangerous, even deadly.    Uncontrolled asthma makes it more likely that you will need emergency department and in-hospital care.    Uncontrolled asthma may cause permanent damage to your lungs.    Peak flow monitoring helps measure how open your airways are.   Taking medicine helps you control your asthma and relieve symptoms when they occur.     Using an Asthma Action Plan will help you keep track of and respond to asthma symptoms.   Avoiding triggers--the things that inflame your airways--will help prevent symptoms and flare-ups.   Your action plan  Your healthcare provider will help you prepare, and when needed, update your personal Asthma Action Plan. Your plan tells you what to do based on your current symptoms. If you don't have an Asthma Action Plan, or if yours isn't up-to-date, make sure you talk with your healthcare provider.  Date Last Reviewed: 1/1/2017 2000-2017 [employer]. [address] [place] [address]. All rights reserved. This information is not intended as a substitute for professional medical care. Always follow your healthcare professional's instructions.          Understanding Asthma Triggers  Triggers are things that cause you to have asthma symptoms. Some triggers you can stay away from completely. Others you can plan for and adjust to. Use this sheet to help you know your triggers.    What are triggers?  Triggers irritate your lungs and lead to asthma flare-ups. Some examples are:    Irritants, such as tobacco smoke or air pollution. These are a concern for all people with asthma.    Allergens or substances that cause allergies, like pets, dust mites, or pollen    Special conditions. These include being ill with a cold or the flu, or certain kinds of weather, including changes in weather. These differ from person to person.    Exercise can trigger asthma in some people. If exercise is one of your triggers, you can learn how to exercise safely.  What triggers  your asthma?  Which of these common triggers cause your asthma to flare up? Check all that apply to you.  Irritants:  ? Tobacco smoke (smoking or secondhand smoke)  ? Smoke from fireplaces  ? Vehicle exhaust  ? Smog or air pollution  ? Aerosol sprays  ? Strong odors, such as perfume, incense, or cooking odors  ? Household , such as ammonia or bleach  Allergens:  ? Cats  ? Dogs  ? Birds  ? Dust or dust mites  ? Pollen  ? Mold  ? Cockroaches  Other triggers:  ? Cold air  ? Hot air  ? Weather changes  ? Exercise  ? Certain foods or food ingredients (such as sulfites)  ? Medicines  ? Emotions such as laughing, crying, or feeling stressed  ? Illness such as colds, flu, and sinus infections  Allergies and allergy treatment  People with asthma often have allergies. If you have allergies, or think you have them, talk with your healthcare provider about testing and treatment. Allergy testing can find out exactly which allergens affect you. Types of tests include:    Skin tests. A small amount of each allergen is put on the skin. Sites are then looked at for an allergic reaction. This could be redness, swelling, or itching. In general, the greater the reaction, the stronger the allergy.    Blood tests. A blood test can show sensitivity to the allergen.  Exposing a person to gradually larger amounts of an allergen can help the body build up a tolerance. This is the purpose of allergy shots (immunotherapy). For this therapy, injections are given over a period of years. At first, you get injections with a very small amount of allergen about once a week. As treatment goes on, the amount of allergen is gradually increased to a certain level. Eventually, you have the injections less often. This therapy can take up to a year to start working. But it can be very effective to manage certain allergies over time.   Date Last Reviewed: 10/1/2016    1184-3922 [employer]. [address] [facility], [place] [address].  All rights reserved. This information is not intended as a substitute for professional medical care. Always follow your healthcare professional's instructions.

## 2018-02-11 NOTE — ED AVS SNAPSHOT
[facility], [place], Emergency Department    [address]    [facility] [address]    Phone:  [phone]    Fax:  [phone]                                       Dionna Lal   MRN: [id number]    Department:  [facility], Emergency Department   Date of Visit:  2/11/2018           After Visit Summary Signature Page     I have received my discharge instructions, and my questions have been answered. I have discussed any challenges I see with this plan with the nurse or doctor.    ..........................................................................................................................................  Patient/Patient Representative Signature      ..........................................................................................................................................  Patient Representative Print Name and Relationship to Patient    ..................................................               ................................................  Date                                            Time    ..........................................................................................................................................  Reviewed by Signature/Title    ...................................................              ..............................................  Date                                                            Time

## 2018-02-11 NOTE — ED PROVIDER NOTES
History     Chief Complaint   Patient presents with     Shortness of Breath     SOB today/Has been coughing up green mucous for 1 week     HPI  Dionna Lal is a 73 year old female with a history of diabetes mellitus, hypertension, arthritis and asthma who presents for evaluation of cough and shortness of breath.  She states that she was in her usual state of health until approximately a week ago at which time she began developing a cough.  She states that at times over the past week the cough has been productive of green sputum.  She notes that she has had progressive shortness of breath over the past week as well despite using albuterol nebulizer solution and pro-air as well as Spiriva and Dulera.  She denies precipitating factors such as exposure to smoke, URI type symptoms, other allergen exposures.  She likewise denies other ongoing symptoms such as chest pain, fever, chills, sweats.  She has had similar symptoms in the past multiple times usually related to her underlying asthma.  She states that she was last seen for similar symptoms approximately 2 months ago at which she was treated with prednisone.  I have reviewed the Medications, Allergies, Past Medical and Surgical History, and Social History in the Epic system.    Review of Systems   Constitutional: Negative for fever.   HENT: Negative for congestion.    Eyes: Negative for redness.   Respiratory: Positive for cough and shortness of breath.    Cardiovascular: Negative for chest pain.   Gastrointestinal: Negative for abdominal pain.   Genitourinary: Negative for difficulty urinating.   Musculoskeletal: Negative for arthralgias and neck stiffness.   Skin: Negative for color change.   Neurological: Negative for headaches.   Psychiatric/Behavioral: Negative for confusion.       Physical Exam   BP: 183/90  Pulse: 120  Heart Rate: 107  Temp: 97.7  F (36.5  C)  Resp: 18  SpO2: 95 %      Physical Exam   Constitutional: No distress.   HENT:   Head:  Atraumatic.   Mouth/Throat: Oropharynx is clear and moist. No oropharyngeal exudate.   Eyes: Pupils are equal, round, and reactive to light. No scleral icterus.   Cardiovascular: Normal heart sounds and intact distal pulses.    Pulmonary/Chest: No respiratory distress. She has wheezes.   Abdominal: Soft. Bowel sounds are normal. There is no tenderness.   Musculoskeletal: She exhibits no edema or tenderness.   Skin: Skin is warm. No rash noted. She is not diaphoretic.       ED Course     ED Course     Procedures        Results for orders placed or performed during the hospital encounter of 02/11/18   XR Chest 2 Views    Narrative    CHEST TWO VIEW   2/11/2018 3:09 PM     HISTORY: Productive cough, shortness of breath.     COMPARISON: 9/11/17    FINDINGS: Heart size normal. Lungs clear. No interval change.      Impression    IMPRESSION: Negative.    JERROD CHATMAN MD   CBC with platelets differential   Result Value Ref Range    WBC 10.4 4.0 - 11.0 10e9/L    RBC Count 3.83 3.8 - 5.2 10e12/L    Hemoglobin 12.2 11.7 - 15.7 g/dL    Hematocrit 36.2 35.0 - 47.0 %    MCV 95 78 - 100 fl    MCH 31.9 26.5 - 33.0 pg    MCHC 33.7 31.5 - 36.5 g/dL    RDW 12.5 10.0 - 15.0 %    Platelet Count 241 150 - 450 10e9/L    Diff Method Automated Method     % Neutrophils 65.3 %    % Lymphocytes 18.4 %    % Monocytes 5.4 %    % Eosinophils 9.1 %    % Basophils 0.5 %    % Immature Granulocytes 1.3 %    Nucleated RBCs 0 0 /100    Absolute Neutrophil 6.8 1.6 - 8.3 10e9/L    Absolute Lymphocytes 1.9 0.8 - 5.3 10e9/L    Absolute Monocytes 0.6 0.0 - 1.3 10e9/L    Absolute Eosinophils 0.9 (H) 0.0 - 0.7 10e9/L    Absolute Basophils 0.1 0.0 - 0.2 10e9/L    Abs Immature Granulocytes 0.1 0 - 0.4 10e9/L    Absolute Nucleated RBC 0.0    Comprehensive metabolic panel   Result Value Ref Range    Sodium 134 133 - 144 mmol/L    Potassium 3.7 3.4 - 5.3 mmol/L    Chloride 96 94 - 109 mmol/L    Carbon Dioxide 30 20 - 32 mmol/L    Anion Gap 8 3 - 14 mmol/L     Glucose 154 (H) 70 - 99 mg/dL    Urea Nitrogen 15 7 - 30 mg/dL    Creatinine 0.85 0.52 - 1.04 mg/dL    GFR Estimate 66 >60 mL/min/1.7m2    GFR Estimate If Black 80 >60 mL/min/1.7m2    Calcium 8.6 8.5 - 10.1 mg/dL    Bilirubin Total 0.4 0.2 - 1.3 mg/dL    Albumin 3.6 3.4 - 5.0 g/dL    Protein Total 7.6 6.8 - 8.8 g/dL    Alkaline Phosphatase 73 40 - 150 U/L    ALT 21 0 - 50 U/L    AST 12 0 - 45 U/L            Labs Ordered and Resulted from Time of ED Arrival Up to the Time of Departure from the ED   CBC WITH PLATELETS DIFFERENTIAL - Abnormal; Notable for the following:        Result Value    Absolute Eosinophils 0.9 (*)     All other components within normal limits   COMPREHENSIVE METABOLIC PANEL - Abnormal; Notable for the following:     Glucose 154 (*)     All other components within normal limits            Assessments & Plan (with Medical Decision Making)   73-year-old female presents for evaluation of cough and shortness of breath in the setting of asthma.  Differential included asthma exacerbation, pneumonia, URI, other.  Initial exam revealed diffuse expiratory wheezing.  Laboratories were obtained including CBC and comprehensive metabolic panel which were normal with exception of slightly elevated glucose 154.  Chest x-ray was normal.  Signs and symptoms consistent with asthma exacerbation.  Patient will was provided with DuoNeb ×2 as well as Solu-Medrol.  She felt significantly improved although her peak flow did not change from 150.  She was offered observation but declined.  Per her request, she will be discharged with a prescription for prednisone which she states has helped significantly in the past.  She was encouraged to return with worsening symptoms.  She will otherwise continue her current medications and follow-up with her primary physician.    I have reviewed the nursing notes.    I have reviewed the findings, diagnosis, plan and need for follow up with the patient.    New Prescriptions    No  medications on file       Final diagnoses:   Mild persistent asthma with exacerbation       2/11/2018   [facility], [place], EMERGENCY DEPARTMENT     Prabhakar Juarez MD  02/11/18 1552       Prabhakar Juarez MD  02/11/18 1553

## 2018-02-11 NOTE — ED AVS SNAPSHOT
[facility], Emergency Department    [address]    [place] [address]    Phone:  [phone]    Fax:  [phone]                                       Dionna Lal   MRN: [id number]    Department:  [facility], Emergency Department   Date of Visit:  2/11/2018           Patient Information     Date Of Birth          1945        Your diagnoses for this visit were:     Mild persistent asthma with exacerbation        You were seen by Prabhakar Juarez MD.      Follow-up Information     Follow up with Maribel Reina MD. Call in 1 day.    Specialty:  Family Practice    Why:  As needed, If symptoms worsen    Contact information:    [facility]  [address] [id number] [phone]          Discharge Instructions         Asthma (Adult)  Asthma is a disease where the medium and  small air passages within the lung go into spasm and restrict the flow of air. Inflammation and swelling of the airways cause further blockage. During an acute asthma attack, these factors cause trouble breathing, wheezing, cough and chest tightness.    An asthma attack can be triggered by many things. Common triggers include infections such as the common cold, bronchitis, and pneumonia. Irritants such as smoke or pollutants in the air, very cold air, emotional upset, and exercise can also trigger an attack. In many adults with asthma, allergies to dust, mold, pollen and animal dander can cause an asthma attack. Skipping doses of daily asthma medicine can also bring on an asthma attack.  Asthma can be controlled using the proper medicines prescribed by your healthcare provider and avoiding exposure to known triggers including allergens and irritants.  Home care    Take prescribed medicine exactly at the times advised. If you need medicine such as from a hand held inhaler or aerosol breathing machine more than every 4 hours, contact your healthcare provider or seek immediate medical attention. If  "prescribed an antibiotic or prednisone, take all of the medicine as prescribed, even if you are feeling better after a few days.    Do not smoke. Avoid being exposed to the smoke of others.    Some people with asthma have worsening of their symptoms when they take aspirin and non-steroidal or fever-reducing medicines like ibuprofen and naproxen. Talk to your healthcare provider if you think this may apply to you.  Follow-up care  Follow up with your healthcare provider, or as advised. Always bring all of your current medicines to any appointments with your healthcare provider. Also bring a complete list of medicines even those not taken for asthma. If you do not already have one, talk to your healthcare provider about developing a personalized \"Asthma Action Plan.\"  A pneumococcal (pneumonia) vaccine and yearly flu shot (every fall) are recommended. Ask your doctor about this.  When to seek medical advice  Call your healthcare provider right away if any of these occur:     Increased wheezing or shortness of breath    Need to use your inhalers more often than usual without relief    Fever of 100.4 F (38 C) or higher, or as directed by your healthcare provider    Coughing up lots of dark-colored or bloody sputum (mucus)    Chest pain with each breath    If you use a peak flow meter as part of an Asthma Action Plan, and you are still in the yellow zone (50% to 80%) 15 minutes after using inhaler medicine.  Call 911  Call 911 if any of the following occur    Trouble walking or talking because of shortness of breath    If you use a peak flow meter as part of an Asthma Action Plan and you are still in the red zone (less than 50%) 15 minutes after using inhaler medicine    Lips or fingernails turning gray or blue  Date Last Reviewed: 5/1/2017 2000-2017 [employer]. [address] [place] [address], [place] [address]. All rights reserved. This information is not intended as a substitute for professional medical care. " Always follow your healthcare professional's instructions.          Controlling Your Asthma  You can do a lot to manage your asthma and improve your quality of life. You will need to work with your healthcare provider to develop a plan. But it s up to you to put this plan into action.  Why you need to take control  You need to control the inflammation in your lungs. Take all medicine as directed, especially controller medicines, even if you feel that your asthma is under good control. You also need to relieve symptoms when you have them. These are long-term tasks. But the more you stay in control, the better you ll feel. If you don t stay in control:    Asthma symptoms may cause you to miss school, work, or activities that you enjoy.    Asthma flare-ups can be dangerous, even deadly.    Uncontrolled asthma makes it more likely that you will need emergency department and in-hospital care.    Uncontrolled asthma may cause permanent damage to your lungs.    Peak flow monitoring helps measure how open your airways are.   Taking medicine helps you control your asthma and relieve symptoms when they occur.     Using an Asthma Action Plan will help you keep track of and respond to asthma symptoms.   Avoiding triggers--the things that inflame your airways--will help prevent symptoms and flare-ups.   Your action plan  Your healthcare provider will help you prepare, and when needed, update your personal Asthma Action Plan. Your plan tells you what to do based on your current symptoms. If you don't have an Asthma Action Plan, or if yours isn't up-to-date, make sure you talk with your healthcare provider.  Date Last Reviewed: 1/1/2017 2000-2017 [employer]. [address] [place] [address], [place] [address]. All rights reserved. This information is not intended as a substitute for professional medical care. Always follow your healthcare professional's instructions.          Understanding Asthma Triggers  Triggers  are things that cause you to have asthma symptoms. Some triggers you can stay away from completely. Others you can plan for and adjust to. Use this sheet to help you know your triggers.    What are triggers?  Triggers irritate your lungs and lead to asthma flare-ups. Some examples are:    Irritants, such as tobacco smoke or air pollution. These are a concern for all people with asthma.    Allergens or substances that cause allergies, like pets, dust mites, or pollen    Special conditions. These include being ill with a cold or the flu, or certain kinds of weather, including changes in weather. These differ from person to person.    Exercise can trigger asthma in some people. If exercise is one of your triggers, you can learn how to exercise safely.  What triggers your asthma?  Which of these common triggers cause your asthma to flare up? Check all that apply to you.  Irritants:  ? Tobacco smoke (smoking or secondhand smoke)  ? Smoke from fireplaces  ? Vehicle exhaust  ? Smog or air pollution  ? Aerosol sprays  ? Strong odors, such as perfume, incense, or cooking odors  ? Household , such as ammonia or bleach  Allergens:  ? Cats  ? Dogs  ? Birds  ? Dust or dust mites  ? Pollen  ? Mold  ? Cockroaches  Other triggers:  ? Cold air  ? Hot air  ? Weather changes  ? Exercise  ? Certain foods or food ingredients (such as sulfites)  ? Medicines  ? Emotions such as laughing, crying, or feeling stressed  ? Illness such as colds, flu, and sinus infections  Allergies and allergy treatment  People with asthma often have allergies. If you have allergies, or think you have them, talk with your healthcare provider about testing and treatment. Allergy testing can find out exactly which allergens affect you. Types of tests include:    Skin tests. A small amount of each allergen is put on the skin. Sites are then looked at for an allergic reaction. This could be redness, swelling, or itching. In general, the greater the  reaction, the stronger the allergy.    Blood tests. A blood test can show sensitivity to the allergen.  Exposing a person to gradually larger amounts of an allergen can help the body build up a tolerance. This is the purpose of allergy shots (immunotherapy). For this therapy, injections are given over a period of years. At first, you get injections with a very small amount of allergen about once a week. As treatment goes on, the amount of allergen is gradually increased to a certain level. Eventually, you have the injections less often. This therapy can take up to a year to start working. But it can be very effective to manage certain allergies over time.   Date Last Reviewed: 10/1/2016    6230-1437 [employer]. [address] [place] [address], [place] [address]. All rights reserved. This information is not intended as a substitute for professional medical care. Always follow your healthcare professional's instructions.          24 Hour Appointment Hotline       To make an appointment at any [facility], call [phone] ([phone]). If you don't have a family doctor or clinic, we will help you find one. [facility] are conveniently located to serve the needs of you and your family.             Review of your medicines      Our records show that you are taking the medicines listed below. If these are incorrect, please call your family doctor or clinic.        Dose / Directions Last dose taken    * albuterol (2.5 MG/3ML) 0.083% neb solution   Dose:  1 ampule        Take 1 ampule by nebulization every 6 hours as needed.   Refills:  0        * PROAIR  (90 BASE) MCG/ACT Inhaler   Quantity:  1 Inhaler   Generic drug:  albuterol        Take two puffs every four hours for 24 hours, then two puffs four times daily for one week.   Refills:  0        carboxymethylcellulose 1 % ophthalmic solution   Commonly known as:  CELLUVISC/REFRESH LIQUIGEL   Dose:  1 drop        Place 1 drop into both eyes  daily as needed for dry eyes   Refills:  0        fluorometholone 0.1 % ophthalmic susp   Commonly known as:  FML LIQUIFILM   Dose:  1 drop        Place 1 drop into both eyes daily   Refills:  0        glipiZIDE 10 MG tablet   Commonly known as:  GLUCOTROL   Dose:  10 mg        Take 10 mg by mouth 2 times daily (before meals)   Refills:  0        hydrochlorothiazide 25 MG tablet   Commonly known as:  HYDRODIURIL   Dose:  25 mg        Take 25 mg by mouth daily   Refills:  0        insulin glargine 100 UNIT/ML injection   Commonly known as:  LANTUS   Dose:  20 Units        Inject 20 Units Subcutaneous At Bedtime   Refills:  0        LANsoprazole 30 MG CR capsule   Commonly known as:  PREVACID   Dose:  30 mg        Take 30 mg by mouth daily   Refills:  0        levothyroxine 100 MCG tablet   Commonly known as:  SYNTHROID/LEVOTHROID   Dose:  100 mcg        Take 100 mcg by mouth daily   Refills:  0        lisinopril 40 MG tablet   Commonly known as:  PRINIVIL/ZESTRIL   Dose:  40 mg        Take 40 mg by mouth daily   Refills:  0        loratadine 10 MG ODT tab   Commonly known as:  CLARITIN REDITABS   Dose:  10 mg        Take 10 mg by mouth daily.   Refills:  0        metFORMIN 850 MG tablet   Commonly known as:  GLUCOPHAGE   Dose:  850 mg        Take 850 mg by mouth 2 times daily (with meals)   Refills:  0        mometasone-formoterol 200-5 MCG/ACT oral inhaler   Commonly known as:  DULERA   Dose:  2 puff        Inhale 2 puffs into the lungs 2 times daily   Refills:  0        montelukast 10 MG tablet   Commonly known as:  SINGULAIR   Dose:  10 mg        Take 10 mg by mouth At Bedtime   Refills:  0        predniSONE 20 MG tablet   Commonly known as:  DELTASONE   Quantity:  10 tablet        Take two tablets (= 40mg) each day for 5 (five) days   Refills:  0        rosuvastatin 10 MG tablet   Commonly known as:  CRESTOR   Dose:  10 mg        Take 10 mg by mouth every other day   Refills:  0        tiotropium 18 MCG capsule  "  Commonly known as:  SPIRIVA   Dose:  18 mcg        Inhale 18 mcg into the lungs daily   Refills:  0        * Notice:  This list has 2 medication(s) that are the same as other medications prescribed for you. Read the directions carefully, and ask your doctor or other care provider to review them with you.            Prescriptions were sent or printed at these locations (1 Prescription)                   Other Prescriptions                Printed at Department/Unit printer (1 of 1)         predniSONE (DELTASONE) 20 MG tablet                Procedures and tests performed during your visit     CBC with platelets differential    Comprehensive metabolic panel    Peak flow, pre and post neb tx    XR Chest 2 Views      Orders Needing Specimen Collection     None      Pending Results     No orders found from 2/9/2018 to 2/12/2018.            Pending Culture Results     No orders found from 2/9/2018 to 2/12/2018.            Pending Results Instructions     If you had any lab results that were not finalized at the time of your Discharge, you can call the ED Lab Result RN at [phone]. You will be contacted by this team for any positive Lab results or changes in treatment. The nurses are available 7 days a week from 10A to 6:30P.  You can leave a message 24 hours per day and they will return your call.        Thank you for choosing [facility]       Thank you for choosing [facility] for your care. Our goal is always to provide you with excellent care. Hearing back from our patients is one way we can continue to improve our services. Please take a few minutes to complete the written survey that you may receive in the mail after you visit with us. Thank you!        [email or web address]@hart Information     be2 lets you send messages to your doctor, view your test results, renew your prescriptions, schedule appointments and more. To sign up, go to [email or web address] . Click on \"Log in\" on the left side of the screen, which will take you " "to the Welcome page. Then click on \"Sign up Now\" on the right side of the page.     You will be asked to enter the access code listed below, as well as some personal information. Please follow the directions to create your username and password.     Your access code is: [email or web address]  Expires: 3/29/2018 11:09 AM     Your access code will  in 90 days. If you need help or a new code, please call your [facility] clinic or [phone].        Care EveryWhere ID     This is your Care EveryWhere ID. This could be used by other organizations to access your [facility] medical records  [id number]        Equal Access to Services     WILLIAN BRADLEY : Christopher Majano, marybel booth, javier llanos, trevor manzo. So [facility] [phone].    ATENCIÓN: Si habla español, tiene a gomez disposición servicios gratuitos de asistencia lingüística. Llame al [phone].    We comply with applicable federal civil rights laws and [place] laws. We do not discriminate on the basis of race, color, national origin, age, disability, sex, sexual orientation, or gender identity.            After Visit Summary       This is your record. Keep this with you and show to your community pharmacist(s) and doctor(s) at your next visit.                  "

## 2018-12-22 ENCOUNTER — APPOINTMENT (OUTPATIENT)
Dept: CT IMAGING | Facility: CLINIC | Age: 73
End: 2018-12-22
Attending: EMERGENCY MEDICINE
Payer: MEDICARE

## 2018-12-22 ENCOUNTER — HOSPITAL ENCOUNTER (EMERGENCY)
Facility: CLINIC | Age: 73
Discharge: HOME | End: 2018-12-22
Attending: EMERGENCY MEDICINE | Admitting: EMERGENCY MEDICINE
Payer: MEDICARE

## 2018-12-22 VITALS
DIASTOLIC BLOOD PRESSURE: 91 MMHG | SYSTOLIC BLOOD PRESSURE: 146 MMHG | OXYGEN SATURATION: 93 % | TEMPERATURE: 98.4 F | HEART RATE: 98 BPM | RESPIRATION RATE: 18 BRPM

## 2018-12-22 DIAGNOSIS — S30.0XXA CONTUSION OF COCCYX, INITIAL ENCOUNTER: ICD-10-CM

## 2018-12-22 DIAGNOSIS — S39.012A STRAIN OF LUMBAR REGION, INITIAL ENCOUNTER: ICD-10-CM

## 2018-12-22 LAB — GLUCOSE BLDC GLUCOMTR-MCNC: 161 MG/DL (ref 70–99)

## 2018-12-22 PROCEDURE — 25000132 ZZH RX MED GY IP 250 OP 250 PS 637: Mod: GY | Performed by: EMERGENCY MEDICINE

## 2018-12-22 PROCEDURE — 72192 CT PELVIS W/O DYE: CPT

## 2018-12-22 PROCEDURE — A9270 NON-COVERED ITEM OR SERVICE: HCPCS | Mod: GY | Performed by: EMERGENCY MEDICINE

## 2018-12-22 PROCEDURE — 99284 EMERGENCY DEPT VISIT MOD MDM: CPT | Mod: 25 | Performed by: EMERGENCY MEDICINE

## 2018-12-22 PROCEDURE — 00000146 ZZHCL STATISTIC GLUCOSE BY METER IP

## 2018-12-22 PROCEDURE — 72131 CT LUMBAR SPINE W/O DYE: CPT

## 2018-12-22 PROCEDURE — 99283 EMERGENCY DEPT VISIT LOW MDM: CPT | Mod: Z6 | Performed by: EMERGENCY MEDICINE

## 2018-12-22 RX ORDER — AMOXICILLIN 250 MG
1 CAPSULE ORAL DAILY
Qty: 30 TABLET | Refills: 0 | Status: SHIPPED | OUTPATIENT
Start: 2018-12-22 | End: 2019-01-21

## 2018-12-22 RX ORDER — BISACODYL 10 MG
10 SUPPOSITORY, RECTAL RECTAL DAILY PRN
Qty: 6 SUPPOSITORY | Refills: 0 | Status: SHIPPED | OUTPATIENT
Start: 2018-12-22 | End: 2019-01-21

## 2018-12-22 RX ORDER — ACETAMINOPHEN AND CODEINE PHOSPHATE 300; 30 MG/1; MG/1
1-2 TABLET ORAL EVERY 6 HOURS PRN
Qty: 12 TABLET | Refills: 0 | Status: SHIPPED | OUTPATIENT
Start: 2018-12-22 | End: 2018-12-23

## 2018-12-22 RX ADMIN — ACETAMINOPHEN AND CODEINE PHOSPHATE 1 TABLET: 300; 30 TABLET ORAL at 11:01

## 2018-12-22 ASSESSMENT — ENCOUNTER SYMPTOMS
NECK STIFFNESS: 0
HEADACHES: 0
NECK PAIN: 0
BACK PAIN: 1
FEVER: 0

## 2018-12-22 NOTE — ED TRIAGE NOTES
Pt fell on Thursday. No LOC. Did not hit head. Pain has gotten progressively worse. Additionally, Pt has not stooled in 3 days.

## 2018-12-22 NOTE — ED PROVIDER NOTES
History     Chief Complaint   Patient presents with     Back Pain     Lower back pain     HPI  Dionna Lal is a 73 year old female with a history of DM-2, arthritis, HTN, osteoporosis who presents to the Emergency Department today for evaluation of back pain following a fall. The patient states that on Thursday she tripped over a rug in her bedroom and fell backwards onto her butt/tailbone. She notes that she experienced immediate pain in her lower back. Since the fall, she has been attempting to medicate her pain with Tylenol and rest with minimal improvement. She has been using a walker since her fall, whereas, at baseline she is able to ambulate sufficiently without the use of a walker. She also reports that she has been constipated since her fall and has not passed stool in 4 days. She also notes a lack of appetite and has not been eating. Her daughter and granddaughter live with her. She states she has not yet been evaluated for her symptoms. Patient is not on any blood thinners. She denies any injury to her head or neck.     I have reviewed the Medications, Allergies, Past Medical and Surgical History, and Social History in the Epic system.    Review of Systems   Constitutional: Negative for fever.   Musculoskeletal: Positive for back pain (lower back). Negative for neck pain and neck stiffness.   Neurological: Negative for headaches.   All other systems reviewed and are negative.    Physical Exam   BP: 151/71  Pulse: 90  Heart Rate: 88  Temp: 98.4  F (36.9  C)  Resp: 18  SpO2: 93 %    Physical Exam   Constitutional: She is oriented to person, place, and time. No distress.   HENT:   Head: Atraumatic.   Eyes: EOM are normal. Pupils are equal, round, and reactive to light.   Cardiovascular: Regular rhythm and normal heart sounds.   Pulmonary/Chest: Breath sounds normal. No respiratory distress. She exhibits no tenderness.   Abdominal: Soft. Bowel sounds are normal. There is no tenderness.    Musculoskeletal: Normal range of motion. She exhibits tenderness.        Cervical back: She exhibits no tenderness.        Thoracic back: She exhibits no tenderness.        Lumbar back: She exhibits no tenderness.   Mild tenderness along midline of lower L spine   Neurological: She is alert and oriented to person, place, and time.   Skin: Skin is warm and dry. No abrasion and no laceration noted. She is not diaphoretic.   Psychiatric: She has a normal mood and affect.       ED Course   10:50 AM  The patient was seen and examined by Dr. Dasilva in Room 06.       Procedures    Assessments & Plan (with Medical Decision Making)   73-year-old female with complaints of tailbone/low back pain after a fall 2 days ago.  She has been ambulatory since the fall.  No other trauma, no head or neck pain.  Evaluated today with a CT of her L-spine and of her pelvis.  This showed no evidence of acute fracture.  She does have an age-indeterminate compression deformity of L1 but upon reexamination she has absolutely no pain at that level, all of her pain is localized very low in the area of her coccyx.  Suspect there is a contusion and strain to the area causing her discomfort.  Further reassuring is that she is ambulatory, has help at home, and has been able to get around the house.  She is also complaining of feeling quite constipated as a result of this.  I encouraged her to get up and walk around the house for at least 10 minutes/day, I will also prescribe her some senna/Colace, as well as suppositories to try to help her constipation.  I think just increasing her activity will help substantially.  She has no abdominal pain or vomiting or other red flags to suggest serious intra-abdominal pathology so I think it is safe to defer evaluating this today and treat her for her constipation.  She is comfortable with the plan of discharge home.  She will see her primary care doctor in 2 days.    I have reviewed the nursing notes.    I  have reviewed the findings, diagnosis, plan and need for follow up with the patient.       Medication List      Started    acetaminophen-codeine 300-30 MG per tablet  Commonly known as:  TYLENOL WITH CODEINE #3  1-2 tablets, Oral, EVERY 6 HOURS PRN     bisacodyl 10 MG suppository  Commonly known as:  DULCOLAX  10 mg, Rectal, DAILY PRN     senna-docusate 8.6-50 MG tablet  Commonly known as:  SENOKOT-S/PERICOLACE  1 tablet, Oral, DAILY          Final diagnoses:   Contusion of coccyx, initial encounter   Strain of lumbar region, initial encounter   IAc, am serving as a trained medical scribe to document services personally performed by Cassidy Dasilva MD, based on the provider's statements to me.   ICassidy MD, was physically present and have reviewed and verified the accuracy of this note documented by Ac Holden.     12/22/2018   [facility], EMERGENCY DEPARTMENT     Cassidy Dasilva MD  12/22/18 8313

## 2018-12-22 NOTE — ED AVS SNAPSHOT
[facility], [place], Emergency Department  [address] [place] [address]  Phone:  [phone]                                    Dionna Lal   MRN: [id number]    Department:  [facility], Emergency Department   Date of Visit:  12/22/2018           After Visit Summary Signature Page    I have received my discharge instructions, and my questions have been answered. I have discussed any challenges I see with this plan with the nurse or doctor.    ..........................................................................................................................................  Patient/Patient Representative Signature      ..........................................................................................................................................  Patient Representative Print Name and Relationship to Patient    ..................................................               ................................................  Date                                   Time    ..........................................................................................................................................  Reviewed by Signature/Title    ...................................................              ..............................................  Date                                               Time          22EPIC Rev 08/18

## 2018-12-22 NOTE — ED NOTES
Initial Assessment: VSS. Pt with c/o back pain after a recent fall. Pt moves very slow and deliberate. Denies SOB. Denies chest/shoulder/arm pain or discomfort. Communicates needs without difficulty. MD at bedside.

## 2018-12-23 ENCOUNTER — HOSPITAL ENCOUNTER (OUTPATIENT)
Facility: CLINIC | Age: 73
Setting detail: OBSERVATION
Discharge: HOME | End: 2018-12-24
Attending: EMERGENCY MEDICINE | Admitting: EMERGENCY MEDICINE
Payer: MEDICARE

## 2018-12-23 DIAGNOSIS — J45.50 SEVERE PERSISTENT ASTHMA WITHOUT COMPLICATION (H): ICD-10-CM

## 2018-12-23 DIAGNOSIS — M54.50 ACUTE MIDLINE LOW BACK PAIN WITHOUT SCIATICA: ICD-10-CM

## 2018-12-23 DIAGNOSIS — R11.2 NAUSEA AND VOMITING, INTRACTABILITY OF VOMITING NOT SPECIFIED, UNSPECIFIED VOMITING TYPE: ICD-10-CM

## 2018-12-23 DIAGNOSIS — E87.1 HYPOSMOLALITY SYNDROME: ICD-10-CM

## 2018-12-23 DIAGNOSIS — K59.00 CONSTIPATION, UNSPECIFIED CONSTIPATION TYPE: ICD-10-CM

## 2018-12-23 DIAGNOSIS — M54.50 ACUTE BILATERAL LOW BACK PAIN WITHOUT SCIATICA: ICD-10-CM

## 2018-12-23 DIAGNOSIS — R53.1 ASTHENIA: ICD-10-CM

## 2018-12-23 DIAGNOSIS — R11.2 NON-INTRACTABLE VOMITING WITH NAUSEA, UNSPECIFIED VOMITING TYPE: ICD-10-CM

## 2018-12-23 DIAGNOSIS — R53.1 WEAKNESS: Primary | ICD-10-CM

## 2018-12-23 DIAGNOSIS — E87.1 HYPONATREMIA: ICD-10-CM

## 2018-12-23 PROBLEM — T46.6X5A ADVERSE REACTION TO STATIN MEDICATION: Status: ACTIVE | Noted: 2018-07-16

## 2018-12-23 LAB
ALBUMIN SERPL-MCNC: 3.5 G/DL (ref 3.4–5)
ALBUMIN UR-MCNC: NEGATIVE MG/DL
ALP SERPL-CCNC: 76 U/L (ref 40–150)
ALT SERPL W P-5'-P-CCNC: 26 U/L (ref 0–50)
ANION GAP SERPL CALCULATED.3IONS-SCNC: 7 MMOL/L (ref 3–14)
APPEARANCE UR: CLEAR
AST SERPL W P-5'-P-CCNC: 19 U/L (ref 0–45)
BACTERIA #/AREA URNS HPF: ABNORMAL /HPF
BASOPHILS # BLD AUTO: 0 10E9/L (ref 0–0.2)
BASOPHILS NFR BLD AUTO: 0.2 %
BILIRUB DIRECT SERPL-MCNC: 0.2 MG/DL (ref 0–0.2)
BILIRUB SERPL-MCNC: 0.8 MG/DL (ref 0.2–1.3)
BILIRUB UR QL STRIP: NEGATIVE
BUN SERPL-MCNC: 17 MG/DL (ref 7–30)
CALCIUM SERPL-MCNC: 8.5 MG/DL (ref 8.5–10.1)
CHLORIDE SERPL-SCNC: 87 MMOL/L (ref 94–109)
CO2 SERPL-SCNC: 30 MMOL/L (ref 20–32)
COLOR UR AUTO: ABNORMAL
CREAT SERPL-MCNC: 0.8 MG/DL (ref 0.52–1.04)
DIFFERENTIAL METHOD BLD: NORMAL
EOSINOPHIL # BLD AUTO: 0.3 10E9/L (ref 0–0.7)
EOSINOPHIL NFR BLD AUTO: 3.5 %
ERYTHROCYTE [DISTWIDTH] IN BLOOD BY AUTOMATED COUNT: 13.1 % (ref 10–15)
GFR SERPL CREATININE-BSD FRML MDRD: 73 ML/MIN/{1.73_M2}
GLUCOSE BLDC GLUCOMTR-MCNC: 139 MG/DL (ref 70–99)
GLUCOSE BLDC GLUCOMTR-MCNC: 202 MG/DL (ref 70–99)
GLUCOSE SERPL-MCNC: 212 MG/DL (ref 70–99)
GLUCOSE UR STRIP-MCNC: 150 MG/DL
HCT VFR BLD AUTO: 36.5 % (ref 35–47)
HGB BLD-MCNC: 12.4 G/DL (ref 11.7–15.7)
HGB UR QL STRIP: NEGATIVE
HYALINE CASTS #/AREA URNS LPF: 1 /LPF (ref 0–2)
IMM GRANULOCYTES # BLD: 0.1 10E9/L (ref 0–0.4)
IMM GRANULOCYTES NFR BLD: 0.7 %
KETONES UR STRIP-MCNC: NEGATIVE MG/DL
LEUKOCYTE ESTERASE UR QL STRIP: NEGATIVE
LIPASE SERPL-CCNC: 101 U/L (ref 73–393)
LYMPHOCYTES # BLD AUTO: 1.1 10E9/L (ref 0.8–5.3)
LYMPHOCYTES NFR BLD AUTO: 12.1 %
MCH RBC QN AUTO: 32 PG (ref 26.5–33)
MCHC RBC AUTO-ENTMCNC: 34 G/DL (ref 31.5–36.5)
MCV RBC AUTO: 94 FL (ref 78–100)
MONOCYTES # BLD AUTO: 0.4 10E9/L (ref 0–1.3)
MONOCYTES NFR BLD AUTO: 4.7 %
MUCOUS THREADS #/AREA URNS LPF: PRESENT /LPF
NEUTROPHILS # BLD AUTO: 7 10E9/L (ref 1.6–8.3)
NEUTROPHILS NFR BLD AUTO: 78.8 %
NITRATE UR QL: NEGATIVE
NRBC # BLD AUTO: 0 10*3/UL
NRBC BLD AUTO-RTO: 0 /100
PH UR STRIP: 5.5 PH (ref 5–7)
PLATELET # BLD AUTO: 187 10E9/L (ref 150–450)
POTASSIUM SERPL-SCNC: 3.6 MMOL/L (ref 3.4–5.3)
PROT SERPL-MCNC: 7.3 G/DL (ref 6.8–8.8)
RBC # BLD AUTO: 3.88 10E12/L (ref 3.8–5.2)
RBC #/AREA URNS AUTO: <1 /HPF (ref 0–2)
SODIUM SERPL-SCNC: 125 MMOL/L (ref 133–144)
SOURCE: ABNORMAL
SP GR UR STRIP: 1.01 (ref 1–1.03)
SQUAMOUS #/AREA URNS AUTO: <1 /HPF (ref 0–1)
TRANS CELLS #/AREA URNS HPF: <1 /HPF (ref 0–1)
UROBILINOGEN UR STRIP-MCNC: NORMAL MG/DL (ref 0–2)
WBC # BLD AUTO: 8.9 10E9/L (ref 4–11)
WBC #/AREA URNS AUTO: 2 /HPF (ref 0–5)

## 2018-12-23 PROCEDURE — 25000131 ZZH RX MED GY IP 250 OP 636 PS 637: Mod: GY | Performed by: EMERGENCY MEDICINE

## 2018-12-23 PROCEDURE — 85025 COMPLETE CBC W/AUTO DIFF WBC: CPT | Performed by: EMERGENCY MEDICINE

## 2018-12-23 PROCEDURE — 25000128 H RX IP 250 OP 636: Performed by: EMERGENCY MEDICINE

## 2018-12-23 PROCEDURE — 80076 HEPATIC FUNCTION PANEL: CPT | Performed by: EMERGENCY MEDICINE

## 2018-12-23 PROCEDURE — 25000132 ZZH RX MED GY IP 250 OP 250 PS 637: Mod: GY | Performed by: NURSE PRACTITIONER

## 2018-12-23 PROCEDURE — G0378 HOSPITAL OBSERVATION PER HR: HCPCS

## 2018-12-23 PROCEDURE — 99285 EMERGENCY DEPT VISIT HI MDM: CPT | Mod: 25

## 2018-12-23 PROCEDURE — 83690 ASSAY OF LIPASE: CPT | Performed by: NURSE PRACTITIONER

## 2018-12-23 PROCEDURE — 96360 HYDRATION IV INFUSION INIT: CPT

## 2018-12-23 PROCEDURE — 96361 HYDRATE IV INFUSION ADD-ON: CPT

## 2018-12-23 PROCEDURE — 83690 ASSAY OF LIPASE: CPT | Performed by: EMERGENCY MEDICINE

## 2018-12-23 PROCEDURE — 99219 ZZC INITIAL OBSERVATION CARE,LEVL II: CPT | Mod: Z6 | Performed by: EMERGENCY MEDICINE

## 2018-12-23 PROCEDURE — A9270 NON-COVERED ITEM OR SERVICE: HCPCS | Mod: GY | Performed by: NURSE PRACTITIONER

## 2018-12-23 PROCEDURE — A9270 NON-COVERED ITEM OR SERVICE: HCPCS | Mod: GY | Performed by: EMERGENCY MEDICINE

## 2018-12-23 PROCEDURE — 00000146 ZZHCL STATISTIC GLUCOSE BY METER IP

## 2018-12-23 PROCEDURE — 81001 URINALYSIS AUTO W/SCOPE: CPT | Performed by: EMERGENCY MEDICINE

## 2018-12-23 PROCEDURE — 96372 THER/PROPH/DIAG INJ SC/IM: CPT

## 2018-12-23 PROCEDURE — 80048 BASIC METABOLIC PNL TOTAL CA: CPT | Performed by: EMERGENCY MEDICINE

## 2018-12-23 PROCEDURE — 25000132 ZZH RX MED GY IP 250 OP 250 PS 637: Mod: GY | Performed by: EMERGENCY MEDICINE

## 2018-12-23 RX ORDER — FLUOROMETHOLONE 0.1 %
1 SUSPENSION, DROPS(FINAL DOSAGE FORM)(ML) OPHTHALMIC (EYE) DAILY
Status: DISCONTINUED | OUTPATIENT
Start: 2018-12-24 | End: 2018-12-24 | Stop reason: HOSPADM

## 2018-12-23 RX ORDER — POLYETHYLENE GLYCOL 3350 17 G/17G
17 POWDER, FOR SOLUTION ORAL DAILY
Status: DISCONTINUED | OUTPATIENT
Start: 2018-12-23 | End: 2018-12-24

## 2018-12-23 RX ORDER — ALBUTEROL SULFATE 0.83 MG/ML
1.25 SOLUTION RESPIRATORY (INHALATION) EVERY 6 HOURS PRN
Status: DISCONTINUED | OUTPATIENT
Start: 2018-12-23 | End: 2018-12-24 | Stop reason: HOSPADM

## 2018-12-23 RX ORDER — ACETAMINOPHEN 325 MG/1
325 TABLET ORAL ONCE
Status: COMPLETED | OUTPATIENT
Start: 2018-12-23 | End: 2018-12-23

## 2018-12-23 RX ORDER — LEVOTHYROXINE SODIUM 25 UG/1
100 TABLET ORAL DAILY
Status: DISCONTINUED | OUTPATIENT
Start: 2018-12-24 | End: 2018-12-24 | Stop reason: HOSPADM

## 2018-12-23 RX ORDER — LORATADINE 10 MG/1
10 TABLET, ORALLY DISINTEGRATING ORAL DAILY
Status: DISCONTINUED | OUTPATIENT
Start: 2018-12-24 | End: 2018-12-23

## 2018-12-23 RX ORDER — ACETAMINOPHEN 650 MG/1
650 SUPPOSITORY RECTAL EVERY 4 HOURS PRN
Status: DISCONTINUED | OUTPATIENT
Start: 2018-12-23 | End: 2018-12-23

## 2018-12-23 RX ORDER — LANSOPRAZOLE 30 MG/1
30 CAPSULE, DELAYED RELEASE ORAL DAILY
Status: DISCONTINUED | OUTPATIENT
Start: 2018-12-24 | End: 2018-12-24 | Stop reason: HOSPADM

## 2018-12-23 RX ORDER — INSULIN GLARGINE 100 [IU]/ML
21 INJECTION, SOLUTION SUBCUTANEOUS AT BEDTIME
COMMUNITY

## 2018-12-23 RX ORDER — ACETAMINOPHEN 325 MG/1
650 TABLET ORAL EVERY 4 HOURS PRN
Status: DISCONTINUED | OUTPATIENT
Start: 2018-12-23 | End: 2018-12-24

## 2018-12-23 RX ORDER — SODIUM CHLORIDE 9 MG/ML
1000 INJECTION, SOLUTION INTRAVENOUS CONTINUOUS
Status: DISCONTINUED | OUTPATIENT
Start: 2018-12-23 | End: 2018-12-24

## 2018-12-23 RX ORDER — NALOXONE HYDROCHLORIDE 0.4 MG/ML
.1-.4 INJECTION, SOLUTION INTRAMUSCULAR; INTRAVENOUS; SUBCUTANEOUS
Status: DISCONTINUED | OUTPATIENT
Start: 2018-12-23 | End: 2018-12-24 | Stop reason: HOSPADM

## 2018-12-23 RX ORDER — BISACODYL 10 MG
10 SUPPOSITORY, RECTAL RECTAL ONCE
Status: COMPLETED | OUTPATIENT
Start: 2018-12-23 | End: 2018-12-23

## 2018-12-23 RX ORDER — INSULIN GLARGINE 100 [IU]/ML
11 INJECTION, SOLUTION SUBCUTANEOUS AT BEDTIME
Status: DISCONTINUED | OUTPATIENT
Start: 2018-12-23 | End: 2018-12-23

## 2018-12-23 RX ORDER — LISINOPRIL 20 MG/1
40 TABLET ORAL DAILY
Status: DISCONTINUED | OUTPATIENT
Start: 2018-12-24 | End: 2018-12-24 | Stop reason: HOSPADM

## 2018-12-23 RX ORDER — ONDANSETRON 2 MG/ML
4 INJECTION INTRAMUSCULAR; INTRAVENOUS EVERY 6 HOURS PRN
Status: DISCONTINUED | OUTPATIENT
Start: 2018-12-23 | End: 2018-12-24 | Stop reason: HOSPADM

## 2018-12-23 RX ORDER — MONTELUKAST SODIUM 10 MG/1
10 TABLET ORAL AT BEDTIME
Status: DISCONTINUED | OUTPATIENT
Start: 2018-12-23 | End: 2018-12-24 | Stop reason: HOSPADM

## 2018-12-23 RX ORDER — CARBOXYMETHYLCELLULOSE SODIUM 10 MG/ML
1 GEL OPHTHALMIC DAILY PRN
Status: DISCONTINUED | OUTPATIENT
Start: 2018-12-23 | End: 2018-12-24 | Stop reason: HOSPADM

## 2018-12-23 RX ORDER — ONDANSETRON 4 MG/1
4 TABLET, ORALLY DISINTEGRATING ORAL EVERY 6 HOURS PRN
Status: DISCONTINUED | OUTPATIENT
Start: 2018-12-23 | End: 2018-12-24 | Stop reason: HOSPADM

## 2018-12-23 RX ORDER — LORATADINE 10 MG/1
10 TABLET ORAL DAILY
Status: DISCONTINUED | OUTPATIENT
Start: 2018-12-24 | End: 2018-12-24 | Stop reason: HOSPADM

## 2018-12-23 RX ORDER — LIDOCAINE 4 G/G
1 PATCH TOPICAL
Status: DISCONTINUED | OUTPATIENT
Start: 2018-12-23 | End: 2018-12-24 | Stop reason: HOSPADM

## 2018-12-23 RX ORDER — SODIUM CHLORIDE 9 MG/ML
INJECTION, SOLUTION INTRAVENOUS CONTINUOUS
Status: DISCONTINUED | OUTPATIENT
Start: 2018-12-23 | End: 2018-12-23

## 2018-12-23 RX ORDER — NICOTINE POLACRILEX 4 MG
15-30 LOZENGE BUCCAL
Status: DISCONTINUED | OUTPATIENT
Start: 2018-12-23 | End: 2018-12-24 | Stop reason: HOSPADM

## 2018-12-23 RX ORDER — AMOXICILLIN 250 MG
1 CAPSULE ORAL 2 TIMES DAILY
Status: DISCONTINUED | OUTPATIENT
Start: 2018-12-23 | End: 2018-12-24 | Stop reason: HOSPADM

## 2018-12-23 RX ORDER — DEXTROSE MONOHYDRATE 25 G/50ML
25-50 INJECTION, SOLUTION INTRAVENOUS
Status: DISCONTINUED | OUTPATIENT
Start: 2018-12-23 | End: 2018-12-24 | Stop reason: HOSPADM

## 2018-12-23 RX ORDER — ONDANSETRON 4 MG/1
4 TABLET, ORALLY DISINTEGRATING ORAL ONCE
Status: COMPLETED | OUTPATIENT
Start: 2018-12-23 | End: 2018-12-23

## 2018-12-23 RX ORDER — ALBUTEROL SULFATE 90 UG/1
1 AEROSOL, METERED RESPIRATORY (INHALATION) EVERY 6 HOURS PRN
Status: DISCONTINUED | OUTPATIENT
Start: 2018-12-23 | End: 2018-12-24 | Stop reason: HOSPADM

## 2018-12-23 RX ORDER — BISACODYL 10 MG
10 SUPPOSITORY, RECTAL RECTAL DAILY PRN
Status: DISCONTINUED | OUTPATIENT
Start: 2018-12-23 | End: 2018-12-24 | Stop reason: HOSPADM

## 2018-12-23 RX ORDER — BISACODYL 10 MG
10 SUPPOSITORY, RECTAL RECTAL DAILY PRN
Status: DISCONTINUED | OUTPATIENT
Start: 2018-12-23 | End: 2018-12-23

## 2018-12-23 RX ADMIN — ONDANSETRON 4 MG: 4 TABLET, ORALLY DISINTEGRATING ORAL at 13:33

## 2018-12-23 RX ADMIN — ACETAMINOPHEN 650 MG: 325 TABLET, FILM COATED ORAL at 22:31

## 2018-12-23 RX ADMIN — ACETAMINOPHEN 325 MG: 325 TABLET, FILM COATED ORAL at 13:33

## 2018-12-23 RX ADMIN — BISACODYL 10 MG: 10 SUPPOSITORY RECTAL at 20:16

## 2018-12-23 RX ADMIN — POLYETHYLENE GLYCOL 3350 17 G: 17 POWDER, FOR SOLUTION ORAL at 20:16

## 2018-12-23 RX ADMIN — LIDOCAINE 1 PATCH: 560 PATCH PERCUTANEOUS; TOPICAL; TRANSDERMAL at 23:37

## 2018-12-23 RX ADMIN — MONTELUKAST SODIUM 10 MG: 10 TABLET, FILM COATED ORAL at 22:31

## 2018-12-23 RX ADMIN — INSULIN GLARGINE 11 UNITS: 100 INJECTION, SOLUTION SUBCUTANEOUS at 22:37

## 2018-12-23 RX ADMIN — SODIUM CHLORIDE 1000 ML: 9 INJECTION, SOLUTION INTRAVENOUS at 15:44

## 2018-12-23 RX ADMIN — SENNOSIDES AND DOCUSATE SODIUM 1 TABLET: 8.6; 5 TABLET ORAL at 20:16

## 2018-12-23 RX ADMIN — SODIUM CHLORIDE 1000 ML: 9 INJECTION, SOLUTION INTRAVENOUS at 18:04

## 2018-12-23 RX ADMIN — ACETAMINOPHEN 650 MG: 325 TABLET, FILM COATED ORAL at 18:09

## 2018-12-23 ASSESSMENT — ENCOUNTER SYMPTOMS
HEADACHES: 0
APPETITE CHANGE: 1
LIGHT-HEADEDNESS: 0
ABDOMINAL DISTENTION: 1
FEVER: 0
COUGH: 0
FREQUENCY: 0
DIFFICULTY URINATING: 0
SHORTNESS OF BREATH: 0
BLOOD IN STOOL: 0
VOMITING: 1
MYALGIAS: 1
NAUSEA: 1
DIARRHEA: 1
WEAKNESS: 0
NUMBNESS: 0
DYSURIA: 0
CONSTIPATION: 1
ABDOMINAL PAIN: 1

## 2018-12-23 ASSESSMENT — MIFFLIN-ST. JEOR: SCORE: 1669.35

## 2018-12-23 NOTE — H&P
"ED OBSERVATION HISTORY & PHYSICAL    Admission Date: 12/23/2018      REASON FOR ADMISSION:   Chief Complaint   Patient presents with     Nausea & Vomiting         HPI:    Dionna Lal is a 73 year old female  with a history of hypothyroidism, chronic nausea, GERD, UTI, and severe persistent asthma who was seen in this emergency department yesterday for a fall (12/20) onto her sacrum. She now presents  who presents with nausea and vomiting.  Per ED MD, \"During evaluation yesterday, patient had a negative CT of her lumbar spine and pelvis.  Was diagnosed with a coccygeal contusion and was discharged with Tylenol 3, bisacodyl suppositories, and senna.  Patient reports ongoing symptoms of constipation with abdominal cramping and discomfort, some bloating, and decreased stool output.  She did take senna and MiraLAX and had a small output of stool that was loose today.  She denies fever, new weakness or numbness or difficulty with gait.  She has persistent coccygeal discomfort.  She believes her nausea and vomiting is a side effect of the Tylenol 3 as it correlates with the time of her dosing this medication.\"    In the ED her NA was noted to be 125. She attempted ambulation and required 1 assist. Due to inadequate help at home she is admitted to ED Observation.     On admission to the observation unit the patient was stable. She is seen sitting up in a wheelchair. Starting to feel better. Would like to try CLD. Denies abdominal pain or nausea currently.     ROS:    CONSTITUTIONAL:  Denies fever, chills,  SKIN: Denies rash.  EYES: Denies visual changes.  RESPIRATORY: Denies dyspnea   CARDIOVASCULAR: Denies palpitations, chest pain/pressure, orthopnea, edema or open areas on extremities.  GASTROINTESTINAL: Denies dysphagia, heartburn or diarrhea.  GENITOURINARY: Denies complaints   MUSCULOSKELTAL: Denies focal weakness. Generally feels weak.  NEUROLOGIC: Denies headaches, numbness or tingling of hands and feet, " confusion, memory changes, lightheadedness/dizziness or difficulties with balance.  PSYCHIATRIC: Denies change in mood.  HEME/LYMPH: Denies active bleeding, swollen nodes  VASCULAR ACCESS: Denies pain, redness, or discharge.    ROS negative other than the symptoms noted above.    History:    Past Medical History:   Diagnosis Date     Arthritis     knees and fingers and shoulders     Asthma      Diabetes mellitus (H)      Diverticulosis      GERD (gastroesophageal reflux disease)      Hypertension      Osteoporosis      Thyroid disease        Past Surgical History:   Procedure Laterality Date     EXTRACAPSULAR CATARACT EXTRATION WITH INTRAOCULAR LENS IMPLANT  2013     REMOVAL OF NAIL BED         Family History   Problem Relation Age of Onset     Hypertension Sister      Diabetes Sister      Hypertension Daughter      Cerebrovascular Disease Brother        Social History     Socioeconomic History     Marital status: Single     Spouse name: Not on file     Number of children: Not on file     Years of education: Not on file     Highest education level: Not on file   Social Needs     Financial resource strain: Not on file     Food insecurity - worry: Not on file     Food insecurity - inability: Not on file     Transportation needs - medical: Not on file     Transportation needs - non-medical: Not on file   Occupational History     Not on file   Tobacco Use     Smoking status: Former Smoker     Packs/day: 0.50     Years: 30.00     Pack years: 15.00     Smokeless tobacco: Never Used   Substance and Sexual Activity     Alcohol use: No     Comment: 2 cans/week     Drug use: No     Sexual activity: No     Partners: Male   Other Topics Concern     Not on file   Social History Narrative     Not on file         No current facility-administered medications on file prior to encounter.   Current Outpatient Medications on File Prior to Encounter:  insulin glargine (BASAGLAR KWIKPEN) 100 UNIT/ML pen Inject 21 Units Subcutaneous At  "Bedtime   albuterol (2.5 MG/3ML) 0.083% nebulizer solution Take 1 ampule by nebulization every 6 hours as needed.     albuterol (PROAIR HFA) 108 (90 BASE) MCG/ACT inhaler Take two puffs every four hours for 24 hours, then two puffs four times daily for one week.   bisacodyl (DULCOLAX) 10 MG suppository Place 1 suppository (10 mg) rectally daily as needed for constipation   carboxymethylcellulose (CELLUVISC/REFRESH LIQUIGEL) 1 % ophthalmic solution Place 1 drop into both eyes daily as needed for dry eyes   fluorometholone (FML LIQUIFILM) 0.1 % ophthalmic susp Place 1 drop into both eyes daily   glipiZIDE (GLUCOTROL) 10 MG tablet Take 10 mg by mouth 2 times daily (before meals)   hydrochlorothiazide (HYDRODIURIL) 25 MG tablet Take 25 mg by mouth daily   insulin glargine (LANTUS) 100 UNIT/ML vial Inject 20 Units Subcutaneous At Bedtime   LANsoprazole (PREVACID) 30 MG CR capsule Take 30 mg by mouth daily   levothyroxine (SYNTHROID/LEVOTHROID) 100 MCG tablet Take 100 mcg by mouth daily   lisinopril (PRINIVIL/ZESTRIL) 40 MG tablet Take 40 mg by mouth daily   loratadine (CLARITIN REDITABS) 10 MG dissolvable tablet Take 10 mg by mouth daily.     metFORMIN (GLUCOPHAGE) 850 MG tablet Take 850 mg by mouth 2 times daily (with meals)   mometasone-formoterol (DULERA) 200-5 MCG/ACT oral inhaler Inhale 2 puffs into the lungs 2 times daily   montelukast (SINGULAIR) 10 MG tablet Take 10 mg by mouth At Bedtime   predniSONE (DELTASONE) 20 MG tablet Take two tablets (= 40mg) each day for 5 (five) days   rosuvastatin (CRESTOR) 10 MG tablet Take 10 mg by mouth every other day   senna-docusate (SENOKOT-S/PERICOLACE) 8.6-50 MG tablet Take 1 tablet by mouth daily   tiotropium (SPIRIVA) 18 MCG capsule Inhale 18 mcg into the lungs daily       Exam:  Vital signs:  Temp: 98.3  F (36.8  C) Temp src: Oral BP: 144/72 Pulse: 70 Heart Rate: 70 Resp: 14 SpO2: 96 % O2 Device: None (Room air)   Height: 162.6 cm (5' 4\") Weight: 117.9 kg (260 " "lb)  Estimated body mass index is 44.63 kg/m  as calculated from the following:    Height as of this encounter: 1.626 m (5' 4\").    Weight as of this encounter: 117.9 kg (260 lb).    All vital signs were reviewed.  GENERAL APPEARENCE:  A/O x4. NAD.  SKIN: Clean, dry, and intact  HEENT: NCAT w/out masses, lesions, or abnormalities. Sclera anicteric, PERRLA, EOMI.  Oral mucosa pink and moist without erythema, exudate, lesions, ulcerations, or thrush. Teeth and gums normal.    NECK: Supple, no masses. No jugular venous distention.   CARDIOVASCULAR: S1, S2 RRR. No murmurs, rubs, or gallops.   RESPIRATORY: Respiratory effort WNL. CTA  bilaterally without crackles/rales/wheeze   GI: Active BS in all 4 quadrants. Abdomen soft and non-tender. No masses or hepatosplenomegaly.  : Deferred  MUSCULOSKELETAL:   Extremities normal, no gross deformities noted, non-tender to palpation.   PV: 2+ bilateral radial and pedal pulses. No edema noted.   NEURO: CN II-XII grossly intact. Speech normal. Appropriate throughout interview.   HEME/LYMPH: No visible bleeding.  PSYCHIATRIC: Mentation and affect appear normal  VASCULAR ACCESS: CDI without erythema or discharge. Non-tender.    Data:    Results for orders placed or performed during the hospital encounter of 12/23/18   UA with Microscopic reflex to Culture   Result Value Ref Range    Color Urine Light Yellow     Appearance Urine Clear     Glucose Urine 150 (A) NEG^Negative mg/dL    Bilirubin Urine Negative NEG^Negative    Ketones Urine Negative NEG^Negative mg/dL    Specific Gravity Urine 1.008 1.003 - 1.035    Blood Urine Negative NEG^Negative    pH Urine 5.5 5.0 - 7.0 pH    Protein Albumin Urine Negative NEG^Negative mg/dL    Urobilinogen mg/dL Normal 0.0 - 2.0 mg/dL    Nitrite Urine Negative NEG^Negative    Leukocyte Esterase Urine Negative NEG^Negative    Source Clean catch urine     WBC Urine 2 0 - 5 /HPF    RBC Urine <1 0 - 2 /HPF    Bacteria Urine Few (A) NEG^Negative /HPF    " Squamous Epithelial /HPF Urine <1 0 - 1 /HPF    Transitional Epi <1 0 - 1 /HPF    Mucous Urine Present (A) NEG^Negative /LPF    Hyaline Casts 1 0 - 2 /LPF   CBC with platelets differential   Result Value Ref Range    WBC 8.9 4.0 - 11.0 10e9/L    RBC Count 3.88 3.8 - 5.2 10e12/L    Hemoglobin 12.4 11.7 - 15.7 g/dL    Hematocrit 36.5 35.0 - 47.0 %    MCV 94 78 - 100 fl    MCH 32.0 26.5 - 33.0 pg    MCHC 34.0 31.5 - 36.5 g/dL    RDW 13.1 10.0 - 15.0 %    Platelet Count 187 150 - 450 10e9/L    Diff Method Automated Method     % Neutrophils 78.8 %    % Lymphocytes 12.1 %    % Monocytes 4.7 %    % Eosinophils 3.5 %    % Basophils 0.2 %    % Immature Granulocytes 0.7 %    Nucleated RBCs 0 0 /100    Absolute Neutrophil 7.0 1.6 - 8.3 10e9/L    Absolute Lymphocytes 1.1 0.8 - 5.3 10e9/L    Absolute Monocytes 0.4 0.0 - 1.3 10e9/L    Absolute Eosinophils 0.3 0.0 - 0.7 10e9/L    Absolute Basophils 0.0 0.0 - 0.2 10e9/L    Abs Immature Granulocytes 0.1 0 - 0.4 10e9/L    Absolute Nucleated RBC 0.0    Basic metabolic panel   Result Value Ref Range    Sodium 125 (L) 133 - 144 mmol/L    Potassium 3.6 3.4 - 5.3 mmol/L    Chloride 87 (L) 94 - 109 mmol/L    Carbon Dioxide 30 20 - 32 mmol/L    Anion Gap 7 3 - 14 mmol/L    Glucose 212 (H) 70 - 99 mg/dL    Urea Nitrogen 17 7 - 30 mg/dL    Creatinine 0.80 0.52 - 1.04 mg/dL    GFR Estimate 73 >60 mL/min/[1.73_m2]    GFR Estimate If Black 84 >60 mL/min/[1.73_m2]    Calcium 8.5 8.5 - 10.1 mg/dL   Hepatic panel   Result Value Ref Range    Bilirubin Direct 0.2 0.0 - 0.2 mg/dL    Bilirubin Total 0.8 0.2 - 1.3 mg/dL    Albumin 3.5 3.4 - 5.0 g/dL    Protein Total 7.3 6.8 - 8.8 g/dL    Alkaline Phosphatase 76 40 - 150 U/L    ALT 26 0 - 50 U/L    AST 19 0 - 45 U/L   Lipase   Result Value Ref Range    Lipase 101 73 - 393 U/L        Assessment/Plan:  Dionna Lal is a 73 year old female  with a history of hypothyroidism, chronic nausea, GERD, UTI, and severe persistent asthma who was seen in this  emergency department yesterday for a fall onto her sacrum who presents with nausea and vomiting.       1. Nausea and Vomiting: Likely related to Tylenol #3. No further nausea and vomiting currently.   -[place] to observation  -IVF  -ADAT  -Antiemetics PRN     2. Hyponatremia: 125 in the ED. Likely related to #1. Was given 1 L NS bolus and started on NS @ 125 ml/hour.   -IVF  -Recheck at midnight      3. Constipation: Took suppository yesterday with some results.   -Miralax daily, senna BID  -PRN suppository daily     4. Generalized Weakness: Likely related to # 1 and #2.   -PT eval in the am     Chronic Medical Problems:  ##DM Type 2: Takes basaglar 21 units at HS, glipizide 5 mg's BID, and metformin 850 mg's BID at home.  -Hold oral agents until tolerating PO intake  -BG AC, HS and at 0200 am   -sliding scale insulin while she is here   - basaglar 11 unit until tolerated PO intake     ##Hypothyroidism: resume home synthroid     ##GERD/Gastritis: resume prevacid     ##Asthma: resume home Singulair, Dulera, and albuterol.     FEN:  -ADAT  -Monitor BMP and replace electrolytes per protocol    Prophy:  -No VTE prophy as patient is up ad karen and anticipate short observation stay   -Encourage ambulation as tolerated     Consults: PT    CODE STATUS:  FULL CODE       DISPOSITION: [place] to observation, anticipate < 2 midnights. Home in the am if NA, N/V and weakness improve.       Kylie Fraga APRN, CNP  Nurse Practitioner   Emergency Department Observation Unit

## 2018-12-23 NOTE — ED NOTES
[facility], [place]   ED Nurse to Floor Handoff     Dionna Lal is a 73 year old female who speaks English and lives with family members,  in a home  They arrived in the ED by ambulance from home    ED Chief Complaint: Nausea & Vomiting    ED Dx;   Final diagnoses:   Non-intractable vomiting with nausea, unspecified vomiting type   Weakness   Hyponatremia         Needed?: No    Allergies:   Allergies   Allergen Reactions     Nsaids Shortness Of Breath   .  Past Medical Hx:   Past Medical History:   Diagnosis Date     Arthritis     knees and fingers and shoulders     Asthma      Diabetes mellitus (H)      Diverticulosis      GERD (gastroesophageal reflux disease)      Hypertension      Osteoporosis      Thyroid disease       Baseline Mental status: WDL  Current Mental Status changes: at basesline    Infection present or suspected this encounter: no  Sepsis suspected: No  Isolation type: No active isolations     Activity level - Baseline/Home:  Independent  Activity Level - Current:   Stand with Assist    Bariatric equipment needed?: No    In the ED these meds were given:   Medications   0.9% sodium chloride BOLUS (1,000 mLs Intravenous New Bag 12/23/18 2810)     Followed by   sodium chloride 0.9% infusion (not administered)   acetaminophen (TYLENOL) tablet 325 mg (325 mg Oral Given 12/23/18 1333)   ondansetron (ZOFRAN-ODT) ODT tab 4 mg (4 mg Oral Given 12/23/18 1333)       Drips running?  No    Home pump  No    Current LDAs  Peripheral IV 12/23/18 Left Upper forearm (Active)   Site Assessment WDL 12/23/2018  2:48 PM   Line Status Saline locked 12/23/2018  2:48 PM   Phlebitis Scale 0-->no symptoms 12/23/2018  2:48 PM   Infiltration Scale 0 12/23/2018  2:48 PM   Number of days: 0       Labs results:   Labs Ordered and Resulted from Time of ED Arrival Up to the Time of Departure from the ED   ROUTINE UA WITH MICROSCOPIC REFLEX TO CULTURE - Abnormal; Notable for the  "following components:       Result Value    Glucose Urine 150 (*)     Bacteria Urine Few (*)     Mucous Urine Present (*)     All other components within normal limits   BASIC METABOLIC PANEL - Abnormal; Notable for the following components:    Sodium 125 (*)     Chloride 87 (*)     Glucose 212 (*)     All other components within normal limits   CBC WITH PLATELETS DIFFERENTIAL       Imaging Studies: No results found for this or any previous visit (from the past 24 hour(s)).    Recent vital signs:   /68   Pulse 86   Temp 98.3  F (36.8  C) (Oral)   Resp 19   Ht 1.626 m (5' 4\")   Wt 117.9 kg (260 lb)   SpO2 94%   BMI 44.63 kg/m      Cardiac Rhythm: Normal Sinus  Pt needs tele? No  Skin/wound Issues: None    Code Status: Full Code    Pain control: fair    Nausea control: good    Abnormal labs/tests/findings requiring intervention:     Family present during ED course? No   Family Comments/Social Situation comments: none    Tasks needing completion: None    Gabino Garcia, RN    [phone] [facility] ED      "

## 2018-12-23 NOTE — ED PROVIDER NOTES
"  History     Chief Complaint   Patient presents with     Nausea & Vomiting     HPI  Dionna Lal is a 73 year old female seen in this emergency department yesterday for a fall onto her sacrum who presents with nausea and vomiting.  During evaluation yesterday, patient had a negative CT of her lumbar spine and pelvis.  Was diagnosed with a coccygeal contusion and was discharged with Tylenol 3, bisacodyl suppositories, and senna.  Patient reports ongoing symptoms of constipation with abdominal cramping and discomfort, some bloating, and decreased stool output.  She did take senna and MiraLAX and had a small output of stool that was loose today.  She denies fever, new weakness or numbness or difficulty with gait.  She has persistent coccygeal discomfort.  She believes her nausea and vomiting is a side effect of the Tylenol 3 as it correlates with the time of her dosing this medication.    I have reviewed the Medications, Allergies, Past Medical and Surgical History, and Social History in the Epic system.    Review of Systems   Constitutional: Positive for appetite change. Negative for fever.   Respiratory: Negative for cough and shortness of breath.    Cardiovascular: Negative for chest pain.   Gastrointestinal: Positive for abdominal distention, abdominal pain, constipation, diarrhea, nausea and vomiting. Negative for blood in stool.   Genitourinary: Negative for difficulty urinating, dysuria, frequency and urgency.   Musculoskeletal: Positive for myalgias. Negative for gait problem.   Neurological: Negative for weakness, light-headedness, numbness and headaches.   All other systems reviewed and are negative.      Physical Exam   BP: 136/62  Pulse: 82  Heart Rate: 83  Temp: 98.3  F (36.8  C)  Resp: 19  Height: 162.6 cm (5' 4\")  Weight: 117.9 kg (260 lb)  SpO2: 95 %      Physical Exam   Constitutional: She is oriented to person, place, and time. She appears well-developed and well-nourished. No distress.   HENT: "   Head: Normocephalic and atraumatic.   Mouth/Throat: Oropharynx is clear and moist. No oropharyngeal exudate.   Eyes: Pupils are equal, round, and reactive to light. No scleral icterus.   Neck: Normal range of motion. Neck supple.   Cardiovascular: Normal rate, regular rhythm, normal heart sounds and intact distal pulses.   Pulmonary/Chest: Effort normal and breath sounds normal. No respiratory distress.   Abdominal: Soft. Bowel sounds are normal. She exhibits no distension and no mass. There is no tenderness. There is no rebound and no guarding.   Musculoskeletal: Normal range of motion. She exhibits no edema, tenderness or deformity.   Neurological: She is alert and oriented to person, place, and time.   Skin: Skin is warm. No rash noted. She is not diaphoretic.   Psychiatric: She has a normal mood and affect. Her behavior is normal. Thought content normal.   Nursing note and vitals reviewed.      ED Course        Procedures             Critical Care time:  none             Labs Ordered and Resulted from Time of ED Arrival Up to the Time of Departure from the ED   ROUTINE UA WITH MICROSCOPIC REFLEX TO CULTURE - Abnormal; Notable for the following components:       Result Value    Glucose Urine 150 (*)     Bacteria Urine Few (*)     Mucous Urine Present (*)     All other components within normal limits   BASIC METABOLIC PANEL - Abnormal; Notable for the following components:    Sodium 125 (*)     Chloride 87 (*)     Glucose 212 (*)     All other components within normal limits   CBC WITH PLATELETS DIFFERENTIAL            Assessments & Plan (with Medical Decision Making)   Dionna Lal is a 73 year old female seen in this emergency department yesterday for a fall onto her sacrum who presents with nausea and vomiting after taking Tylenol 3.  Also complains of ongoing symptoms of constipation.    Diff diagnosis: Constipation, expected side effect of opiate medication, coccygeal contusion    Patient's trauma  workup from yesterday's encounter reviewed and I believe this was complete.  Her discomfort is consistent with stable coccygeal contusion.  No new neurologic symptoms or worsening of pain.  Patient was also prescribed appropriate home regimen for constipation which she has been hesitant to follow but was encouraged to do so today.  I gave her 1 dose of Zofran and discussed pain control regimen.  Patient agreed her symptoms of nausea and vomiting were likely related to Tylenol 3.  We discussed an alternate treatment regimen and the fact that any narcotic was likely to cause similar symptoms.  She agreed that her pain may be adequately controlled with regular Tylenol without the codeine.  She was advised to take this every 4 hours to treat her contusion pain.  She did not want any Zofran for home as she believes her symptoms would resolve after stopping the Tylenol 3.  Also advised her to comply with the bowel regimen that was prescribed to treat ongoing symptoms of constipation and abdominal bloating.  She had no abdominal tenderness or distention on exam and has been passing small amount of stool.    Patient ambulated in the emergency department with her cane but required 1 person assist and complained of bilateral lower extremity weakness.  She states she did not have significant sacral pain during this walk but felt leg weakness for unknown reasons.  Added on basic labs and will likely need to admit the patient for ED observation.  She does not feel that she has adequate help at home to be able to get around.    Patient's labs notable for hyponatremia.  Given normal saline.  No UTI.  Anemia.  Reviewed with ED observation YUDELKA who accepted her for obs admission.  Suspect patient's generalized weakness will resolve with replacement of sodium and she should meet discharge goals within the next 24 hours.    I have reviewed the nursing notes.    I have reviewed the findings, diagnosis, plan and need for follow up with  the patient.       Medication List      Discontinued    acetaminophen-codeine 300-30 MG per tablet  Commonly known as:  TYLENOL WITH CODEINE #3            Final diagnoses:   Non-intractable vomiting with nausea, unspecified vomiting type   Weakness   Hyponatremia       12/23/2018   [facility], [place], EMERGENCY DEPARTMENT     Joie Brunson MD  12/23/18 0658       Joie Brunson MD  12/23/18 7780

## 2018-12-23 NOTE — LETTER
Transition Communication Hand-off for Care Transitions to Next Level of Care Provider    Name: Dionna Lal  : 1945  MRN #: [id number]  Primary Care Provider: Maribel Reina     Primary Clinic: [facility] [address] [facility] [address]     Reason for Hospitalization:  Hyponatremia [E87.1]  Weakness [R53.1]  Non-intractable vomiting with nausea, unspecified vomiting type [R11.2]  Admit Date/Time: 2018 12:35 PM  Discharge Date: 18  Payor Source: Payor: MEDICARE / Plan: MEDICARE / Product Type: Medicare /          Reason for Communication Hand-off Referral: Other Continuity of care    Discharge Plan:       Concern for non-adherence with plan of care:  N  Discharge Needs Assessment:      Already enrolled in Tele-monitoring program and name of program:  N  Follow-up specialty is recommended: No    Follow-up plan:  No future appointments.    Any outstanding tests or procedures:        Referrals     Future Labs/Procedures    Home Care PT Referral for Hospital Discharge     Comments:    ___________________________________________________  [facility]  Phone: [phone]  Fax: [phone]  ___________________________________________________      PT to eval and treat    Your provider has ordered home care - physical therapy. If you have not been contacted within 2 days of your discharge please call the department phone number listed on the top of this document.            Lianet Washington    AVS/Discharge Summary is the source of truth; this is a helpful guide for improved communication of patient story

## 2018-12-24 ENCOUNTER — APPOINTMENT (OUTPATIENT)
Dept: GENERAL RADIOLOGY | Facility: CLINIC | Age: 73
End: 2018-12-24
Attending: NURSE PRACTITIONER
Payer: MEDICARE

## 2018-12-24 VITALS
WEIGHT: 260 LBS | SYSTOLIC BLOOD PRESSURE: 134 MMHG | RESPIRATION RATE: 18 BRPM | HEART RATE: 83 BPM | OXYGEN SATURATION: 95 % | BODY MASS INDEX: 44.39 KG/M2 | DIASTOLIC BLOOD PRESSURE: 70 MMHG | HEIGHT: 64 IN | TEMPERATURE: 98.2 F

## 2018-12-24 LAB
ALBUMIN SERPL-MCNC: 3.5 G/DL (ref 3.4–5)
ALP SERPL-CCNC: 71 U/L (ref 40–150)
ALT SERPL W P-5'-P-CCNC: 30 U/L (ref 0–50)
ANION GAP SERPL CALCULATED.3IONS-SCNC: 8 MMOL/L (ref 3–14)
AST SERPL W P-5'-P-CCNC: 22 U/L (ref 0–45)
BASOPHILS # BLD AUTO: 0 10E9/L (ref 0–0.2)
BASOPHILS NFR BLD AUTO: 0.4 %
BILIRUB SERPL-MCNC: 0.7 MG/DL (ref 0.2–1.3)
BUN SERPL-MCNC: 12 MG/DL (ref 7–30)
CALCIUM SERPL-MCNC: 8.3 MG/DL (ref 8.5–10.1)
CHLORIDE SERPL-SCNC: 96 MMOL/L (ref 94–109)
CO2 SERPL-SCNC: 29 MMOL/L (ref 20–32)
CREAT SERPL-MCNC: 0.75 MG/DL (ref 0.52–1.04)
DIFFERENTIAL METHOD BLD: NORMAL
EOSINOPHIL # BLD AUTO: 0.4 10E9/L (ref 0–0.7)
EOSINOPHIL NFR BLD AUTO: 4.8 %
ERYTHROCYTE [DISTWIDTH] IN BLOOD BY AUTOMATED COUNT: 13.1 % (ref 10–15)
GFR SERPL CREATININE-BSD FRML MDRD: 79 ML/MIN/{1.73_M2}
GLUCOSE BLDC GLUCOMTR-MCNC: 106 MG/DL (ref 70–99)
GLUCOSE BLDC GLUCOMTR-MCNC: 178 MG/DL (ref 70–99)
GLUCOSE SERPL-MCNC: 123 MG/DL (ref 70–99)
HCT VFR BLD AUTO: 37.3 % (ref 35–47)
HGB BLD-MCNC: 12.4 G/DL (ref 11.7–15.7)
IMM GRANULOCYTES # BLD: 0.1 10E9/L (ref 0–0.4)
IMM GRANULOCYTES NFR BLD: 0.7 %
LYMPHOCYTES # BLD AUTO: 2 10E9/L (ref 0.8–5.3)
LYMPHOCYTES NFR BLD AUTO: 23.8 %
MCH RBC QN AUTO: 31.9 PG (ref 26.5–33)
MCHC RBC AUTO-ENTMCNC: 33.2 G/DL (ref 31.5–36.5)
MCV RBC AUTO: 96 FL (ref 78–100)
MONOCYTES # BLD AUTO: 0.6 10E9/L (ref 0–1.3)
MONOCYTES NFR BLD AUTO: 6.7 %
NEUTROPHILS # BLD AUTO: 5.5 10E9/L (ref 1.6–8.3)
NEUTROPHILS NFR BLD AUTO: 63.6 %
NRBC # BLD AUTO: 0 10*3/UL
NRBC BLD AUTO-RTO: 0 /100
PLATELET # BLD AUTO: 182 10E9/L (ref 150–450)
POTASSIUM SERPL-SCNC: 3.9 MMOL/L (ref 3.4–5.3)
PROT SERPL-MCNC: 7.2 G/DL (ref 6.8–8.8)
RBC # BLD AUTO: 3.89 10E12/L (ref 3.8–5.2)
SODIUM SERPL-SCNC: 133 MMOL/L (ref 133–144)
WBC # BLD AUTO: 8.6 10E9/L (ref 4–11)

## 2018-12-24 PROCEDURE — 36415 COLL VENOUS BLD VENIPUNCTURE: CPT | Performed by: NURSE PRACTITIONER

## 2018-12-24 PROCEDURE — 85025 COMPLETE CBC W/AUTO DIFF WBC: CPT | Performed by: NURSE PRACTITIONER

## 2018-12-24 PROCEDURE — 96361 HYDRATE IV INFUSION ADD-ON: CPT

## 2018-12-24 PROCEDURE — 40000893 ZZH STATISTIC PT IP EVAL DEFER

## 2018-12-24 PROCEDURE — 80053 COMPREHEN METABOLIC PANEL: CPT | Performed by: NURSE PRACTITIONER

## 2018-12-24 PROCEDURE — 00000146 ZZHCL STATISTIC GLUCOSE BY METER IP

## 2018-12-24 PROCEDURE — 74018 RADEX ABDOMEN 1 VIEW: CPT

## 2018-12-24 PROCEDURE — 25000132 ZZH RX MED GY IP 250 OP 250 PS 637: Mod: GY | Performed by: NURSE PRACTITIONER

## 2018-12-24 PROCEDURE — 99217 ZZC OBSERVATION CARE DISCHARGE: CPT | Mod: Z6 | Performed by: NURSE PRACTITIONER

## 2018-12-24 PROCEDURE — G0378 HOSPITAL OBSERVATION PER HR: HCPCS

## 2018-12-24 PROCEDURE — A9270 NON-COVERED ITEM OR SERVICE: HCPCS | Mod: GY | Performed by: EMERGENCY MEDICINE

## 2018-12-24 PROCEDURE — 25000132 ZZH RX MED GY IP 250 OP 250 PS 637: Mod: GY | Performed by: EMERGENCY MEDICINE

## 2018-12-24 PROCEDURE — A9270 NON-COVERED ITEM OR SERVICE: HCPCS | Mod: GY | Performed by: NURSE PRACTITIONER

## 2018-12-24 PROCEDURE — 25000128 H RX IP 250 OP 636: Performed by: EMERGENCY MEDICINE

## 2018-12-24 RX ORDER — HYDROCODONE BITARTRATE AND ACETAMINOPHEN 5; 325 MG/1; MG/1
1 TABLET ORAL EVERY 6 HOURS PRN
Status: DISCONTINUED | OUTPATIENT
Start: 2018-12-24 | End: 2018-12-24 | Stop reason: HOSPADM

## 2018-12-24 RX ORDER — POLYETHYLENE GLYCOL 3350 17 G/17G
17 POWDER, FOR SOLUTION ORAL 3 TIMES DAILY
Status: DISCONTINUED | OUTPATIENT
Start: 2018-12-24 | End: 2018-12-24 | Stop reason: HOSPADM

## 2018-12-24 RX ORDER — LIDOCAINE 4 G/G
1 PATCH TOPICAL EVERY 24 HOURS
Qty: 7 PATCH | Refills: 0 | Status: SHIPPED | OUTPATIENT
Start: 2018-12-24 | End: 2019-01-23

## 2018-12-24 RX ORDER — HYDROCODONE BITARTRATE AND ACETAMINOPHEN 5; 325 MG/1; MG/1
1 TABLET ORAL EVERY 6 HOURS PRN
Qty: 12 TABLET | Refills: 0 | Status: SHIPPED | OUTPATIENT
Start: 2018-12-24 | End: 2018-12-27

## 2018-12-24 RX ADMIN — POLYETHYLENE GLYCOL 3350 17 G: 17 POWDER, FOR SOLUTION ORAL at 14:50

## 2018-12-24 RX ADMIN — HYDROCODONE BITARTRATE AND ACETAMINOPHEN 1 TABLET: 5; 325 TABLET ORAL at 14:30

## 2018-12-24 RX ADMIN — LEVOTHYROXINE SODIUM 100 MCG: 25 TABLET ORAL at 08:20

## 2018-12-24 RX ADMIN — MAGNESIUM HYDROXIDE 30 ML: 400 SUSPENSION ORAL at 10:58

## 2018-12-24 RX ADMIN — LORATADINE 10 MG: 10 TABLET ORAL at 08:21

## 2018-12-24 RX ADMIN — SODIUM CHLORIDE 1000 ML: 9 INJECTION, SOLUTION INTRAVENOUS at 00:00

## 2018-12-24 RX ADMIN — ACETAMINOPHEN 650 MG: 325 TABLET, FILM COATED ORAL at 04:56

## 2018-12-24 RX ADMIN — LISINOPRIL 40 MG: 20 TABLET ORAL at 08:21

## 2018-12-24 RX ADMIN — LANSOPRAZOLE 30 MG: 30 CAPSULE, DELAYED RELEASE ORAL at 08:21

## 2018-12-24 RX ADMIN — FLUOROMETHOLONE 1 DROP: 1 SOLUTION/ DROPS OPHTHALMIC at 08:20

## 2018-12-24 RX ADMIN — SENNOSIDES AND DOCUSATE SODIUM 1 TABLET: 8.6; 5 TABLET ORAL at 08:22

## 2018-12-24 RX ADMIN — POLYETHYLENE GLYCOL 3350 17 G: 17 POWDER, FOR SOLUTION ORAL at 08:22

## 2018-12-24 NOTE — DISCHARGE SUMMARY
"ED Observation Discharge Summary    Dionna Lal   MRN# [id number]  Age: 73 year old   YOB: 1945            Date of Admission:  12/23/2018    Date of Discharge:  12/24/2018  Admitting Physician:  Joie Brunson MD  Discharge Physician: Dr. Claire TREJO  NP/PA: Kylie Fraga, CJ        DISCHARGE DIAGNOSIS:   1. N/V  2. Weakness  3. [place]    INTERVAL HISTORY: pain improved. Up ad karen. Eating    PHYSICAL EXAM:   Blood pressure 137/76, pulse 74, temperature 98.6  F (37  C), temperature source Oral, resp. rate 18, height 1.626 m (5' 4\"), weight 117.9 kg (260 lb), SpO2 95 %, not currently breastfeeding.     GENERAL APPEARENCE: Pleasant, generally appears well, A/O x4. NAD.  SKIN: Clean, dry, and intact without visible lesions, rash, jaundice, cyanosis, erythema, ecchymoses to exposed areas. No hair or nail changes.  HEENT/NECK: NCAT w/out masses, lesions, or abnormalities. Sclera anicteric, PERRLA, EOMI.  Oral mucosa pink and moist without erythema, exudate, lesions, ulcerations, or thrush. Teeth and gums normal. Neck supple, no masses. No jugular venous distention.   CARDIOVASCULAR: S1, S2 RRR. No murmurs, rubs, or gallops.   RESPIRATORY: Respiratory effort WNL. CTA  bilaterally without crackles/rales/wheeze   GI: Active BS in all 4 quadrants. Abdomen soft and non-tender. No masses or hepatosplenomegaly.  : Deferred  MUSCULOSKELETAL: Gait is steady. Strength 5/5 in major muscle groups of bilateral UE and LE.  Extremities normal, no gross deformities noted, non-tender to palpation.   PV: 2+ bilateral radial and pedal pulses. No edema noted.   NEURO: CN II-XII grossly intact. Speech normal. Appropriate throughout interview.   Sensation grossly WNL. Finger to nose and rapid alternating movements WDL  HEME/LYMPH: No visible bleeding. No palpable submandibular, submental, pre or postauricular, occipital, cervical, or supraclavicular lymph nodes  PSYCHIATRIC: Mentation and affect appear normal  VASCULAR " ACCESS: CDI without erythema or discharge. Non-tender.    PROCEDURES AND IMAGING:   Results for orders placed or performed during the hospital encounter of 12/23/18 (from the past 24 hour(s))   UA with Microscopic reflex to Culture   Result Value Ref Range    Color Urine Light Yellow     Appearance Urine Clear     Glucose Urine 150 (A) NEG^Negative mg/dL    Bilirubin Urine Negative NEG^Negative    Ketones Urine Negative NEG^Negative mg/dL    Specific Gravity Urine 1.008 1.003 - 1.035    Blood Urine Negative NEG^Negative    pH Urine 5.5 5.0 - 7.0 pH    Protein Albumin Urine Negative NEG^Negative mg/dL    Urobilinogen mg/dL Normal 0.0 - 2.0 mg/dL    Nitrite Urine Negative NEG^Negative    Leukocyte Esterase Urine Negative NEG^Negative    Source Clean catch urine     WBC Urine 2 0 - 5 /HPF    RBC Urine <1 0 - 2 /HPF    Bacteria Urine Few (A) NEG^Negative /HPF    Squamous Epithelial /HPF Urine <1 0 - 1 /HPF    Transitional Epi <1 0 - 1 /HPF    Mucous Urine Present (A) NEG^Negative /LPF    Hyaline Casts 1 0 - 2 /LPF   CBC with platelets differential   Result Value Ref Range    WBC 8.9 4.0 - 11.0 10e9/L    RBC Count 3.88 3.8 - 5.2 10e12/L    Hemoglobin 12.4 11.7 - 15.7 g/dL    Hematocrit 36.5 35.0 - 47.0 %    MCV 94 78 - 100 fl    MCH 32.0 26.5 - 33.0 pg    MCHC 34.0 31.5 - 36.5 g/dL    RDW 13.1 10.0 - 15.0 %    Platelet Count 187 150 - 450 10e9/L    Diff Method Automated Method     % Neutrophils 78.8 %    % Lymphocytes 12.1 %    % Monocytes 4.7 %    % Eosinophils 3.5 %    % Basophils 0.2 %    % Immature Granulocytes 0.7 %    Nucleated RBCs 0 0 /100    Absolute Neutrophil 7.0 1.6 - 8.3 10e9/L    Absolute Lymphocytes 1.1 0.8 - 5.3 10e9/L    Absolute Monocytes 0.4 0.0 - 1.3 10e9/L    Absolute Eosinophils 0.3 0.0 - 0.7 10e9/L    Absolute Basophils 0.0 0.0 - 0.2 10e9/L    Abs Immature Granulocytes 0.1 0 - 0.4 10e9/L    Absolute Nucleated RBC 0.0    Basic metabolic panel   Result Value Ref Range    Sodium 125 (L) 133 - 144  mmol/L    Potassium 3.6 3.4 - 5.3 mmol/L    Chloride 87 (L) 94 - 109 mmol/L    Carbon Dioxide 30 20 - 32 mmol/L    Anion Gap 7 3 - 14 mmol/L    Glucose 212 (H) 70 - 99 mg/dL    Urea Nitrogen 17 7 - 30 mg/dL    Creatinine 0.80 0.52 - 1.04 mg/dL    GFR Estimate 73 >60 mL/min/[1.73_m2]    GFR Estimate If Black 84 >60 mL/min/[1.73_m2]    Calcium 8.5 8.5 - 10.1 mg/dL   Hepatic panel   Result Value Ref Range    Bilirubin Direct 0.2 0.0 - 0.2 mg/dL    Bilirubin Total 0.8 0.2 - 1.3 mg/dL    Albumin 3.5 3.4 - 5.0 g/dL    Protein Total 7.3 6.8 - 8.8 g/dL    Alkaline Phosphatase 76 40 - 150 U/L    ALT 26 0 - 50 U/L    AST 19 0 - 45 U/L   Lipase   Result Value Ref Range    Lipase 101 73 - 393 U/L   Glucose by meter   Result Value Ref Range    Glucose 202 (H) 70 - 99 mg/dL   Glucose by meter   Result Value Ref Range    Glucose 139 (H) 70 - 99 mg/dL   Comprehensive metabolic panel   Result Value Ref Range    Sodium 133 133 - 144 mmol/L    Potassium 3.9 3.4 - 5.3 mmol/L    Chloride 96 94 - 109 mmol/L    Carbon Dioxide 29 20 - 32 mmol/L    Anion Gap 8 3 - 14 mmol/L    Glucose 123 (H) 70 - 99 mg/dL    Urea Nitrogen 12 7 - 30 mg/dL    Creatinine 0.75 0.52 - 1.04 mg/dL    GFR Estimate 79 >60 mL/min/[1.73_m2]    GFR Estimate If Black >90 >60 mL/min/[1.73_m2]    Calcium 8.3 (L) 8.5 - 10.1 mg/dL    Bilirubin Total 0.7 0.2 - 1.3 mg/dL    Albumin 3.5 3.4 - 5.0 g/dL    Protein Total 7.2 6.8 - 8.8 g/dL    Alkaline Phosphatase 71 40 - 150 U/L    ALT 30 0 - 50 U/L    AST 22 0 - 45 U/L   CBC with platelets differential   Result Value Ref Range    WBC 8.6 4.0 - 11.0 10e9/L    RBC Count 3.89 3.8 - 5.2 10e12/L    Hemoglobin 12.4 11.7 - 15.7 g/dL    Hematocrit 37.3 35.0 - 47.0 %    MCV 96 78 - 100 fl    MCH 31.9 26.5 - 33.0 pg    MCHC 33.2 31.5 - 36.5 g/dL    RDW 13.1 10.0 - 15.0 %    Platelet Count 182 150 - 450 10e9/L    Diff Method Automated Method     % Neutrophils 63.6 %    % Lymphocytes 23.8 %    % Monocytes 6.7 %    % Eosinophils 4.8 %    %  Basophils 0.4 %    % Immature Granulocytes 0.7 %    Nucleated RBCs 0 0 /100    Absolute Neutrophil 5.5 1.6 - 8.3 10e9/L    Absolute Lymphocytes 2.0 0.8 - 5.3 10e9/L    Absolute Monocytes 0.6 0.0 - 1.3 10e9/L    Absolute Eosinophils 0.4 0.0 - 0.7 10e9/L    Absolute Basophils 0.0 0.0 - 0.2 10e9/L    Abs Immature Granulocytes 0.1 0 - 0.4 10e9/L    Absolute Nucleated RBC 0.0    Glucose by meter   Result Value Ref Range    Glucose 178 (H) 70 - 99 mg/dL     DISCHARGE MEDICATIONS:   Current Discharge Medication List      CONTINUE these medications which have NOT CHANGED    Details   insulin glargine (BASAGLAR KWIKPEN) 100 UNIT/ML pen Inject 21 Units Subcutaneous At Bedtime      albuterol (2.5 MG/3ML) 0.083% nebulizer solution Take 1 ampule by nebulization every 6 hours as needed.        albuterol (PROAIR HFA) 108 (90 BASE) MCG/ACT inhaler Take two puffs every four hours for 24 hours, then two puffs four times daily for one week.  Qty: 1 Inhaler, Refills: 0      bisacodyl (DULCOLAX) 10 MG suppository Place 1 suppository (10 mg) rectally daily as needed for constipation  Qty: 6 suppository, Refills: 0      carboxymethylcellulose (CELLUVISC/REFRESH LIQUIGEL) 1 % ophthalmic solution Place 1 drop into both eyes daily as needed for dry eyes      fluorometholone (FML LIQUIFILM) 0.1 % ophthalmic susp Place 1 drop into both eyes daily      glipiZIDE (GLUCOTROL) 10 MG tablet Take 10 mg by mouth 2 times daily (before meals)      hydrochlorothiazide (HYDRODIURIL) 25 MG tablet Take 25 mg by mouth daily      insulin glargine (LANTUS) 100 UNIT/ML vial Inject 20 Units Subcutaneous At Bedtime      LANsoprazole (PREVACID) 30 MG CR capsule Take 30 mg by mouth daily      levothyroxine (SYNTHROID/LEVOTHROID) 100 MCG tablet Take 100 mcg by mouth daily      lisinopril (PRINIVIL/ZESTRIL) 40 MG tablet Take 40 mg by mouth daily      loratadine (CLARITIN REDITABS) 10 MG dissolvable tablet Take 10 mg by mouth daily.        metFORMIN (GLUCOPHAGE) 850  "MG tablet Take 850 mg by mouth 2 times daily (with meals)      mometasone-formoterol (DULERA) 200-5 MCG/ACT oral inhaler Inhale 2 puffs into the lungs 2 times daily      montelukast (SINGULAIR) 10 MG tablet Take 10 mg by mouth At Bedtime      predniSONE (DELTASONE) 20 MG tablet Take two tablets (= 40mg) each day for 5 (five) days  Qty: 10 tablet, Refills: 0      rosuvastatin (CRESTOR) 10 MG tablet Take 10 mg by mouth every other day      senna-docusate (SENOKOT-S/PERICOLACE) 8.6-50 MG tablet Take 1 tablet by mouth daily  Qty: 30 tablet, Refills: 0      tiotropium (SPIRIVA) 18 MCG capsule Inhale 18 mcg into the lungs daily         STOP taking these medications       acetaminophen-codeine (TYLENOL WITH CODEINE #3) 300-30 MG per tablet Comments:   Reason for Stopping:                 CONSULTATIONS:   Consultation during this admission received from:  PT  CC   BRIEF HISTORY OF PRESENT ILLNESS:   (Adopted from admission H&P).    \"Dionna Lal is a 73 year old female  with a history of hypothyroidism, chronic nausea, GERD, UTI, and severe persistent asthma who was seen in this emergency department yesterday for a fall (12/20) onto her sacrum. She now presents  who presents with nausea and vomiting.  Per ED MD, \"During evaluation yesterday, patient had a negative CT of her lumbar spine and pelvis.  Was diagnosed with a coccygeal contusion and was discharged with Tylenol 3, bisacodyl suppositories, and senna.  Patient reports ongoing symptoms of constipation with abdominal cramping and discomfort, some bloating, and decreased stool output.  She did take senna and MiraLAX and had a small output of stool that was loose today.  She denies fever, new weakness or numbness or difficulty with gait.  She has persistent coccygeal discomfort.  She believes her nausea and vomiting is a side effect of the Tylenol 3 as it correlates with the time of her dosing this medication.\"     In the ED her NA was noted to be 125. She " "attempted ambulation and required 1 assist. Due to inadequate help at home she is admitted to ED Observation.      On admission to the observation unit the patient was stable. She is seen sitting up in a wheelchair. Starting to feel better. Would like to try CLD. Denies abdominal pain or nausea.\"      ED OBSERVATION COURSE:  Dionna Lal is a 73 year old female  with a history of hypothyroidism, chronic nausea, GERD, UTI, and severe persistent asthma who was seen in this emergency department yesterday for a fall onto her sacrum who presents with nausea and vomiting.         1. Abominal Bloating, Nausea and Vomiting: N/V likely related to Tylenol #3. No further nausea and vomiting currently. This am reports increased abdominal distension and discomfort. No focal areas of overt abdominal pain. Abdominal x-ray showed mildly prominent gas-filled loops of colon, no pneumatosis or portal venous gas.  Continue bowel regimen.   -ADAT  -Antiemetics PRN        2. Hyponatremia: 125 in the ED. Likely related to #1. Was given 1 L NS bolus and started on NS @ 125 ml/hour. NA improved this am. Recommend this is rechecked by PCP later this week.      3. Constipation: Took suppository yesterday with some results.   -Miralax up to TID, senna BID  -PRN suppository daily      4. Generalized Weakness: Likely related to # 1 and #2.   -PT evaluated the patient and recommended discharge with home care. Care coordinator set this up for the patient.      5. Coccygeal Contusion: After fall on 12/20. Was seen in the ED on 12/22 due to pain. CT showed Age indeterminate compression deformity of the L1 vertebral body  (new since 2/11/2018) with 20-30% height loss anteriorly. No retropulsion. N/v with Tylenol #3.   -Will try Norco for pain management         Chronic Medical Problems:  ##DM Type 2: Takes basaglar 21 units at HS, glipizide 5 mg's BID, and metformin 850 mg's BID at home.  -Hold oral agents until tolerating PO intake  -BG AC, HS " and at 0200 am   -sliding scale insulin while she is here   - basaglar 11 unit until tolerated PO intake      ##Hypothyroidism: resume home synthroid      ##GERD/Gastritis: resume prevacid      ##Asthma: resume home Singulair, Dulera, and albuterol          DISCHARGE DISPOSITION:   Discharged to home. The patient was discharged in a stable condition and agreed to discharge plan.    DISCHARGE INSTRUCTIONS AND FOLLOW-UP:  Discharge Procedure Orders   Home Care PT Referral for Hospital Discharge   Referral Type: Home Health Therapies & Aides   Number of Visits Requested: 1     MD face to face encounter   Order Comments: Documentation of Face to Face and Certification for Home Health Services    I certify that patient: Dionna Lal is under my care and that I, or a nurse practitioner or physician's assistant working with me, had a face-to-face encounter that meets the physician face-to-face encounter requirements with this patient on: 12/24/2018.    This encounter with the patient was in whole, or in part, for the following medical condition, which is the primary reason for home health care: s/p fall on sacrum.    I certify that, based on my findings, the following services are medically necessary home health services: Physical Therapy.    My clinical findings support the need for the above services because: Physical Therapy Services are needed to assess and treat the following functional impairments: mobility, pain, endurance, home safety.    Further, I certify that my clinical findings support that this patient is homebound (i.e. absences from home require considerable and taxing effort and are for medical reasons or [facility] services or infrequently or of short duration when for other reasons) because: Requires assistance of another person or specialized equipment to access medical services because patient: Requires supervision of another for safe transfer...    Based on the above findings. I certify that this  patient is confined to the home and needs intermittent skilled nursing care, physical therapy and/or speech therapy.  The patient is under my care, and I have initiated the establishment of the plan of care.  This patient will be followed by a physician who will periodically review the plan of care.  Physician/Provider to provide follow up care: Maribel Reina    Attending [facility] physician (the Medicare certified [facility] provider): Nona Rangel MD  Physician Signature: See electronic signature associated with these discharge orders.  Date: 12/24/2018      Attestation:   I have reviewed today's vital signs, notes, medications, labs and imaging.      BRIDGETTE Mcclelland, CNP  Emergency Department Observation Unit

## 2018-12-24 NOTE — PLAN OF CARE
Outpatient/Observation goals to be met before discharge home:  -diagnostic tests and consults completed and resulted - no  -vital signs normal or at patient baseline - yes  -tolerating oral intake to maintain hydration - yes  -returns to baseline functional status - no  -safe disposition plan has been identified - not yet  Nurse to notify provider when observation goals have been met and patient is ready for discharge.

## 2018-12-24 NOTE — PLAN OF CARE
Observation goals PRIOR TO DISCHARGE     Comments: -diagnostic tests and consults completed and resulted PENDING  -vital signs normal or at patient baseline YES  -tolerating oral intake to maintain hydration YES: also on IVF  -returns to baseline functional status NO  -safe disposition plan has been identified PENDING  Nurse to notify provider when observation goals have been met and patient is ready for discharge.     Pt is A&Ox4, VSS, and tolerating a soft diet. Pt ambulating with home cane and an assist of 1 to the bathroom. Denies N/V, and is still c/o pain in her tailbone and is using a lidocaine patch. Pt also c/o constipation of 4 days, given senna and Miralax this AM.  PT to see pt this AM. Nurse will continue to monitor.

## 2018-12-24 NOTE — PLAN OF CARE
Outpatient/Observation goals to be met before discharge home:  -diagnostic tests and consults completed and resulted - no  -vital signs normal or at patient baseline - yes  -tolerating oral intake to maintain hydration - yes  -returns to baseline functional status - no  -safe disposition plan has been identified - not yet  Nurse to notify provider when observation goals have been met and patient is ready for discharge.    Pt passing gas but no bowel movement. Pt reports pain in lower back, medication and repositioning help with the pain.

## 2018-12-24 NOTE — PLAN OF CARE
"Outpatient/Observation goals to be met before discharge home:  -diagnostic tests and consults completed and resulted: No    -vital signs normal or at patient baseline: Yes,  /69 (BP Location: Left arm)   Pulse 79   Temp 97.6  F (36.4  C) (Oral)   Resp 16   Ht 1.626 m (5' 4\")   Wt 117.9 kg (260 lb)   SpO2 94%   BMI 44.63 kg/m      -tolerating oral intake to maintain hydration: Yes water overnight    -returns to baseline functional status: Improving    -safe disposition plan has been identified: Pending    Nurse to notify provider when observation goals have been met and patient is ready for discharge.     A/o x 4. Reported of lower back pain. Lidoderm  Patch applied  and Tylenol administered partially effective. 1 Assist to bathroom, gait slow but steady.Sitting up in chair . Denies n/v. Blood glucose 123 this AM. PT to see Pt. No acute changes overnight. Will continue to monitor.  Call light within reach          "

## 2018-12-24 NOTE — PROGRESS NOTES
"Outpatient/Observation goals to be met before discharge home:  -diagnostic tests and consults completed and resulted: No    -vital signs normal or at patient baseline: Yes, /67 (BP Location: Left arm)   Pulse 79   Temp 98.5  F (36.9  C) (Oral)   Resp 16   Ht 1.626 m (5' 4\")   Wt 117.9 kg (260 lb)   SpO2 92%   BMI 44.63 kg/m      -tolerating oral intake to maintain hydration: Yes    -returns to baseline functional status: No    -safe disposition plan has been identified: Pending    Nurse to notify provider when observation goals have been met and patient is ready for discharge.     A/o x 4. Reported of lower back pain. Lidoderm  Patch applied and repo in bed.  Call light within reach      "

## 2018-12-24 NOTE — PROGRESS NOTES
[facility], [facility] Progress Note - ,Emergency Department Observation Unit       Date of Admission:  12/23/2018  Assessment & Plan      Dionna Lal is a 73 year old female  with a history of hypothyroidism, chronic nausea, GERD, UTI, and severe persistent asthma who was seen in this emergency department yesterday for a fall onto her sacrum who presents with nausea and vomiting.         1. Nausea and Vomiting: Likely related to Tylenol #3. No further nausea and vomiting currently. Tolerated CLD for dinner.  -[place] to observation  -IVF  -ADAT  -Antiemetics PRN      2. Hyponatremia: 125 in the ED. Likely related to #1. Was given 1 L NS bolus and started on NS @ 125 ml/hour.   -IVF  -Recheck in am     3. Constipation: Took suppository yesterday with some results. Received a Suppository this evening.   -Miralax daily, senna BID  -suppository daily      4. Generalized Weakness: Likely related to # 1 and #2.   -PT eval in the am      Chronic Medical Problems:  ##DM Type 2: Takes basaglar 21 units at HS, glipizide 5 mg's BID, and metformin 850 mg's BID at home.  -Resume oral agents in am  -BG AC, HS and at 0200 am   -sliding scale insulin while she is here   - basaglar 11 unit until tolerated PO intake      ##Hypothyroidism: resume home synthroid      ##GERD/Gastritis: resume prevacid      ##Asthma: resume home Singulair, Dulera, and albuterol.      FEN:  -ADAT  -Monitor BMP and replace electrolytes per protocol     Prophy:  -No VTE prophy as patient is up ad karen and anticipate short observation stay   -Encourage ambulation as tolerated      Consults: PT            Diet: Advance Diet as Tolerated: Regular Diet Adult    DVT Prophylaxis: Low Risk/Ambulatory with no VTE prophylaxis indicated  Womack Catheter: not present      Disposition Plan   Expected discharge: Tomorrow, recommended to prior living arrangement once Tolerating oral intake, VSS and Na improved. .  Entered:  BRIDGETTE Madera CNP 12/23/2018, 8:12 PM       The patient's care was discussed with the Attending Physician, Dr. Rangel and Patient.    BRIDGETTE Pugh, NP  Emergency Department  [phone] Ex [id number]      ______________________________________________________________________    Interval History   Reported continued constipation. Received Suppository with results. Patient reports feeling better. Denies any nausea or vomiting. Able to tolerate dinner.     Data reviewed today: I reviewed all medications, new labs and imaging results over the last 24 hours. I personally reviewed no images or EKG's today.    Physical Exam   Vital Signs: Temp: 98.3  F (36.8  C) Temp src: Oral BP: 144/72 Pulse: 70 Heart Rate: 70 Resp: 14 SpO2: 96 % O2 Device: None (Room air)    Weight: 260 lbs 0 oz  GENERAL APPEARENCE:  A/O x4. NAD.  SKIN: Clean, dry, and intact  HEENT: NCAT w/out masses, lesions, or abnormalities. Sclera anicteric, PERRLA, EOMI.  Oral mucosa pink and moist without erythema, exudate, lesions, ulcerations, or thrush. Teeth and gums normal.    NECK: Supple, no masses. No jugular venous distention.   CARDIOVASCULAR: S1, S2 RRR. No murmurs, rubs, or gallops.   RESPIRATORY: Respiratory effort WNL. CTA  bilaterally without crackles/rales/wheeze   GI: Active BS in all 4 quadrants. Abdomen soft and non-tender. No masses or hepatosplenomegaly.  : Deferred  MUSCULOSKELETAL:   Extremities normal, no gross deformities noted, non-tender to palpation.   PV: 2+ bilateral radial and pedal pulses. No edema noted.   NEURO: CN II-XII grossly intact. Speech normal. Appropriate throughout interview.   HEME/LYMPH: No visible bleeding.  PSYCHIATRIC: Mentation and affect appear normal      Data   Recent Labs   Lab 12/23/18  1447   WBC 8.9   HGB 12.4   MCV 94      *   POTASSIUM 3.6   CHLORIDE 87*   CO2 30   BUN 17   CR 0.80   ANIONGAP 7   ELVIS 8.5   *   ALBUMIN 3.5   PROTTOTAL 7.3   BILITOTAL 0.8   ALKPHOS 76    ALT 26   AST 19   LIPASE 101     No results found for this or any previous visit (from the past 24 hour(s)).  Medications     sodium chloride 1,000 mL (12/23/18 1804)       bisacodyl  10 mg Rectal Once     [START ON 12/24/2018] fluorometholone  1 drop Both Eyes Daily     [START ON 12/24/2018] fluticasone-vilanterol  1 puff Inhalation Daily     insulin aspart  1-7 Units Subcutaneous TID AC     insulin aspart  1-5 Units Subcutaneous At Bedtime     insulin glargine  11 Units Subcutaneous At Bedtime     [START ON 12/24/2018] LANsoprazole  30 mg Oral Daily     [START ON 12/24/2018] levothyroxine  100 mcg Oral Daily     [START ON 12/24/2018] lisinopril  40 mg Oral Daily     [START ON 12/24/2018] loratadine  10 mg Oral Daily     montelukast  10 mg Oral At Bedtime     polyethylene glycol  17 g Oral Daily     senna-docusate  1 tablet Oral BID

## 2018-12-24 NOTE — PROGRESS NOTES
Care Coordinator - Discharge Planning    Admission Date/Time:  12/23/2018  Attending MD:  Nona Rangel MD     Data  Date of initial CC assessment:  12/24/18  Chart reviewed, discussed with interdisciplinary team.   Patient was admitted for:   1. Non-intractable vomiting with nausea, unspecified vomiting type    2. Weakness    3. Hyponatremia         Assessment   Concerns with insurance coverage for discharge needs: None.  Current Living Situation: Patient lives with family.  Support System: Supportive and Involved  Services Involved: None  Transportation at Discharge: Car and Family or friend will provide  Transportation to Medical Appointments:    - Not applicable  Barriers to Discharge: none     Per discussion with care team anticipate that pt will be discharged home today.  Home PT has been recommended.   Met with pt.  Introduced RNCC role.  Pt lives with her family and notes no concerns regarding her ability to return home.  Reviewed/discussed home care services/options.   Pt first choice is [facility] and second choice is [facility].      Spoke with Errol, [facility] [phone], Fax [phone] regarding pt status.  Per discussion with Errol home PT can see pt within the next 7 days but d/t the holiday could not guarantee being able to see patient by Wednesday.  Reviewed with POLA Espinal who requests that ideally pt should be seen sooner than a week d/t weakness, need for home safety eval.    Email referral made to [facility]  Discharge orders updated.       Coordination of Care and Referrals: Provided patient/family with options for Home Care.      Plan  Anticipated Discharge Date:  12/24/18  Anticipated Discharge Plan: Home with home care    CTS Handoff completed:  NO    Merry Washington RN BSN, PHN RN Care Coordinator  Medicine Teams: Gold 1; Gold 2; ED/Observation   [phone]  Pager: [phone]  Weekend RN Care Coordinator job code * * * [id number]  12/24/2018 10:28 AM

## 2018-12-24 NOTE — PROGRESS NOTES
[facility] [place]  Daily Progress Note          Assessment & Plan:   Dionna Lal is a 73 year old female  with a history of hypothyroidism, chronic nausea, GERD, UTI, and severe persistent asthma who was seen in this emergency department yesterday for a fall onto her sacrum who presents with nausea and vomiting.         1. Abominal Bloating, Nausea and Vomiting: N/V likely related to Tylenol #3. No further nausea and vomiting currently. This am reports increased abdominal distension and discomfort. No focal areas of overt abdominal pain.   -[place] to observation  -IVF  -ADAT  -Antiemetics PRN   - Abdominal xray   - MOM now      2. Hyponatremia: 125 in the ED. Likely related to #1. Was given 1 L NS bolus and started on NS @ 125 ml/hour. NA improved this am and will discontinue fluids   -Monitor and will need close follow-up     3. Constipation: Took suppository yesterday with some results.   -Miralax daily, senna BID  -PRN suppository daily      4. Generalized Weakness: Likely related to # 1 and #2.   -PT evaluated the patient and recommended discharge with home care.     5. Coccygeal Contusion: After fall on 12/20. Was seen in the ED on 12/22 due to pain. CT showed Age indeterminate compression deformity of the L1 vertebral body  (new since 2/11/2018) with 20-30% height loss anteriorly. No retropulsion. N/v with Tylenol #3.   -Will try Norco for pain management        Chronic Medical Problems:  ##DM Type 2: Takes basaglar 21 units at HS, glipizide 5 mg's BID, and metformin 850 mg's BID at home.  -Hold oral agents until tolerating PO intake  -BG AC, HS and at 0200 am   -sliding scale insulin while she is here   - basaglar 11 unit until tolerated PO intake      ##Hypothyroidism: resume home synthroid      ##GERD/Gastritis: resume prevacid      ##Asthma: resume home Singulair, Dulera, and albuterol      FEN:  As tolerated  Lines: PIV  Prophylaxis: early ambulation           "Consults:   PT         Discharge Planning:   Anticipate later today with home health care        Interval History:   Noted coccyx pain this am. Later noted worsening abdominal distension and discomfort. Coccyx pain improved.                Physical Exam:   /67 (BP Location: Left arm)   Pulse 74   Temp 98.7  F (37.1  C) (Oral)   Resp 18   Ht 1.626 m (5' 4\")   Wt 117.9 kg (260 lb)   SpO2 93%   BMI 44.63 kg/m       GENERAL: Alert and oriented x 3. NAD.   HEENT: Anicteric sclera. Mucous membranes moist.   CV: RRR. S1, S2. No murmurs appreciated.   RESPIRATORY: Effort normal. Lungs CTAB with no wheezing, rales, rhonchi.   GI: Abdomen soft. Obese, mild distension noted. Normoactive bowel sounds present in all quadrants. No tenderness, rebound, guarding.   NEUROLOGICAL: No focal deficits. Moves all extremities.    EXTREMITIES: No peripheral edema. Intact bilateral pedal pulses.   SKIN: No jaundice. No rashes.     Medication list reviewed.   Today's labs and imaging were reviewed.     BRIDGETTE Mcclelland, CNP  Emergency Department Observation Unit      "

## 2018-12-24 NOTE — PLAN OF CARE
PT consult received to assess pt safety for discharge home. Pt initially using quad cane for ambulation (purchased from [employer]) and needing [place] for safety. Utilized FWW for ambulation and pt able to amb indep 200 feet. Recommend pt use FWW at discharge, pt reports having access to one from daughter and will begin using. Pt demos indep bed mobility and sit <> stand transfers. Limited by pain during session but reports better with use of walker. Safe to discharge home, recommending home PT/OT for home safety evaluation and to progress indep and safety with functional mobility in home setting.

## 2018-12-24 NOTE — PROGRESS NOTES
Discharge instruction reviewed.  Patient verbalized understanding. PIV removed, patient transported to the discharge pharmacy then to the main lobby. Family awaiting at main lobby. Patient discharged

## 2018-12-24 NOTE — PROGRESS NOTES
Emergency Medicine Observation Attending note     The patient was independently seen and examined by me. The chart, vital signs, and labs were reviewed. The patient's findings were discussed with the YUDELKA on the observation unit, and I agree with the findings of the note and the plan.     74 yo female, admitted to the OBS unit after presenting to the ER with N/V and weakness. She had been seen the day prior for low back pain after a fall, had a negative w/u, and was discharge with T#3s. She started feeling nauseated after taking this, and did vomit twice. Sx improved with treatment in the ED, though the pt felt weak with walking. Basic labs were done, and she was found to be hyponatremic, with a sodium of 125. She was given fluids in the ED and admitted for gentle hydration and repeat labs/eval in the am. It was felt that nausea was secondary to the codeine, so T#3s were stopped, and pt transited to plain tylenol. This morning she reports that the nausea is gone, but she's still having a lot of pain over the coccyx, and is having trouble moving around.        Exam:  General: awake, alert, NAD  HEENT: NC/AT  Neck: supple  Lungs: CTA-B  Heart: RRR, no M/R/G  Abd: soft, ND/NT  Ext: non-tender, no edema, nl/equal strength in the lower extremities, + tenderness over coccyx with palpation      Assessment/plan:  1. N/V - resolved at this time. Likely secondary to codeine. Will try to advance diet this morning.   2. Hyponatremia - unclear what the cause was. Only vomited twice. Most recent Na was 140 on 11/28. Resolved - will stop IV fluids.  Will need close f/u as an outpt.  3. Weakness - suspect secondary to hyponatremia. Will have PT assess this am.  4. Coccygeal pain - tylenol not adaquate - will switch to vicodin.

## 2018-12-24 NOTE — PROGRESS NOTES
"Emergency Medicine Observation Attending note    The patient was independently seen and examined by me. The chart, vital signs, and labs were reviewed. The patient's findings were discussed with the YUDELKA on the observation unit, and I agree with the findings of the note and the plan.    74 yo female, admitted to the OBS unit after presenting to the ER with N/V and weakness. She had been seen the day prior for low back pain after a fall, had a negative w/u, and was discharge with T#3s. She started feeling nauseated after taking this, and did vomit twice. Sx improved with treatment in the ED, though the pt felt weak with walking. Basic labs were done, and she was found to be hyponatremic, with a sodium of 125. She was given fluids in the ED and admitted for gentle hydration and repeat labs/eval in the am. It was felt that nausea was secondary to the codeine, so T#3s were stopped, and pt transited to plain tylenol. Tonight she reports that her nausea is completely gone, though she continues to have pain in her bottom.    /72   Pulse 70   Temp 98.3  F (36.8  C) (Oral)   Resp 14   Ht 1.626 m (5' 4\")   Wt 117.9 kg (260 lb)   SpO2 96%   BMI 44.63 kg/m      Exam:  General: awake, alert, NAD  HEENT: NC/AT  Neck: supple  Lungs: CTA-B  Heart: RRR, no M/R/G  Abd: soft, ND/NT  Ext: non-tender, no edema, nl/equal strength in the lower extremities.    Assessment/plan:  1. N/V - resolved at this time. Likely secondary to codeine. Will continue to monitor.  2. Hyponatremia - unclear what the cause was. Only vomited twice. Most recent Na was 140 on 11/28. Will continue gentle hydration overnight and recheck in the am. Will need close f/u.  3. Weakness - suspect secondary to hyponatremia. Will have PT assess in am.  4. Coccygeal pain - tylenol for pain. Will add lidoderm patch.      "

## 2018-12-24 NOTE — PLAN OF CARE
Observation goals PRIOR TO DISCHARGE     Comments: -diagnostic tests and consults completed and resulted PENDING: abd xray d/t constipation  -vital signs normal or at patient baseline YES  -tolerating oral intake to maintain hydration YES: also on IVF  -returns to baseline functional status NO  -safe disposition plan has been identified PENDING  Nurse to notify provider when observation goals have been met and patient is ready for discharge.

## 2018-12-24 NOTE — DISCHARGE INSTRUCTIONS
--Take 5mg of Norco in every 6 hours as needed for pain   --Take daily Miralax with the goal of daily bowel movement (can increase up to 3 times per day)  --Daily Lidoderm patch applied to sacral area, keep it on for 12 hours and keep it off for 12 hours  --Follow up with primary care provider within 7 days, and please return to the emergency department for worsening pain, numbness or weakness, blood in your stools, recurrent vomiting and dehydration.

## 2018-12-24 NOTE — PROGRESS NOTES
Patient reports nausea and vomiting resolving after having large bowel movements. She is tolerating oral diet. Sacral pain has improved significantly today. However, she does not think Lidoderm patch helped. Patient would like to go home     Plan:  --Take 5mg of Norco in every 6 hours as needed for pain   --Take daily Miralax with the goal of daily bowel movement (can increase up to 3 times per day)  --Daily Lidoderm patch applied to sacral area, keep it on for 12 hours and keep it off for 12 hours  --Follow up with your primary care provider within 7 days, and please return to ED if not able to tolerate diet.

## (undated) RX ORDER — METOPROLOL TARTRATE 1 MG/ML
INJECTION, SOLUTION INTRAVENOUS
Status: DISPENSED
Start: 2017-09-12

## (undated) RX ORDER — DOBUTAMINE HYDROCHLORIDE 200 MG/100ML
INJECTION INTRAVENOUS
Status: DISPENSED
Start: 2017-09-12